# Patient Record
Sex: FEMALE | Race: WHITE | Employment: PART TIME | URBAN - METROPOLITAN AREA
[De-identification: names, ages, dates, MRNs, and addresses within clinical notes are randomized per-mention and may not be internally consistent; named-entity substitution may affect disease eponyms.]

---

## 2017-03-27 ENCOUNTER — HOSPITAL ENCOUNTER (EMERGENCY)
Age: 27
Discharge: HOME | End: 2017-03-27
Attending: EMERGENCY MEDICINE

## 2017-03-27 VITALS
HEART RATE: 88 BPM | WEIGHT: 105 LBS | BODY MASS INDEX: 17.49 KG/M2 | HEIGHT: 65 IN | RESPIRATION RATE: 22 BRPM | OXYGEN SATURATION: 98 % | SYSTOLIC BLOOD PRESSURE: 94 MMHG | DIASTOLIC BLOOD PRESSURE: 56 MMHG | TEMPERATURE: 98 F

## 2017-03-27 DIAGNOSIS — J03.90 ACUTE TONSILLITIS, UNSPECIFIED ETIOLOGY: Primary | ICD-10-CM

## 2017-03-27 LAB
ALBUMIN SERPL-MCNC: 3.7 G/DL (ref 3.5–4.8)
ALP LIVER SERPL-CCNC: 72 U/L (ref 37–98)
ALT SERPL-CCNC: 16 U/L (ref 14–54)
AST SERPL-CCNC: 15 U/L (ref 15–41)
BAND %: 7 %
BASOPHIL % MANUAL: 0 %
BASOPHIL ABSOLUTE MANUAL: 0 X10(3) UL (ref 0–0.1)
BILIRUB SERPL-MCNC: 0.8 MG/DL (ref 0.1–2)
BUN BLD-MCNC: 10 MG/DL (ref 8–20)
CALCIUM BLD-MCNC: 8.7 MG/DL (ref 8.3–10.3)
CHLORIDE: 104 MMOL/L (ref 101–111)
CO2: 24 MMOL/L (ref 22–32)
CREAT BLD-MCNC: 0.67 MG/DL (ref 0.55–1.02)
EOSINOPHIL % MANUAL: 0 %
EOSINOPHIL ABSOLUTE MANUAL: 0 X10(3) UL (ref 0–0.3)
ERYTHROCYTE [DISTWIDTH] IN BLOOD BY AUTOMATED COUNT: 12.3 % (ref 11.5–16)
GLUCOSE BLD-MCNC: 124 MG/DL (ref 70–99)
HCT VFR BLD AUTO: 36.9 % (ref 34–50)
HGB BLD-MCNC: 12.9 G/DL (ref 12–16)
LYMPHOCYTE % MANUAL: 5 %
LYMPHOCYTE ABSOLUTE MANUAL: 1.26 X10(3) UL (ref 0.9–4)
M PROTEIN MFR SERPL ELPH: 7.4 G/DL (ref 6.1–8.3)
MCH RBC QN AUTO: 32.9 PG (ref 27–33.2)
MCHC RBC AUTO-ENTMCNC: 35 G/DL (ref 31–37)
MCV RBC AUTO: 94.1 FL (ref 81–100)
MONOCYTE % MANUAL: 8 %
MONOCYTE ABSOLUTE MANUAL: 2.01 X10(3) UL (ref 0.1–0.6)
MONOSCREEN: NEGATIVE
MORPHOLOGY: NORMAL
NEUTROPHIL ABS PRELIM: 20.94 X10 (3) UL (ref 1.3–6.7)
NEUTROPHIL ABSOLUTE MANUAL: 21.84 X10(3) UL (ref 1.3–6.7)
NEUTROPHILS % MANUAL: 80 %
PLATELET # BLD AUTO: 188 10(3)UL (ref 150–450)
PLATELET MORPHOLOGY: NORMAL
POTASSIUM SERPL-SCNC: 3.6 MMOL/L (ref 3.6–5.1)
RBC # BLD AUTO: 3.92 X10(6)UL (ref 3.8–5.1)
RED CELL DISTRIBUTION WIDTH-SD: 42.7 FL (ref 35.1–46.3)
SODIUM SERPL-SCNC: 135 MMOL/L (ref 136–144)
TOTAL CELLS COUNTED: 100
WBC # BLD AUTO: 25.1 X10(3) UL (ref 4–13)

## 2017-03-27 PROCEDURE — 96374 THER/PROPH/DIAG INJ IV PUSH: CPT

## 2017-03-27 PROCEDURE — 86403 PARTICLE AGGLUT ANTBDY SCRN: CPT | Performed by: EMERGENCY MEDICINE

## 2017-03-27 PROCEDURE — 85007 BL SMEAR W/DIFF WBC COUNT: CPT | Performed by: EMERGENCY MEDICINE

## 2017-03-27 PROCEDURE — 99284 EMERGENCY DEPT VISIT MOD MDM: CPT

## 2017-03-27 PROCEDURE — 85027 COMPLETE CBC AUTOMATED: CPT | Performed by: EMERGENCY MEDICINE

## 2017-03-27 PROCEDURE — 96375 TX/PRO/DX INJ NEW DRUG ADDON: CPT

## 2017-03-27 PROCEDURE — 87081 CULTURE SCREEN ONLY: CPT | Performed by: EMERGENCY MEDICINE

## 2017-03-27 PROCEDURE — 87430 STREP A AG IA: CPT | Performed by: EMERGENCY MEDICINE

## 2017-03-27 PROCEDURE — 85025 COMPLETE CBC W/AUTO DIFF WBC: CPT | Performed by: EMERGENCY MEDICINE

## 2017-03-27 PROCEDURE — 96372 THER/PROPH/DIAG INJ SC/IM: CPT

## 2017-03-27 PROCEDURE — 96361 HYDRATE IV INFUSION ADD-ON: CPT

## 2017-03-27 PROCEDURE — 80053 COMPREHEN METABOLIC PANEL: CPT | Performed by: EMERGENCY MEDICINE

## 2017-03-27 RX ORDER — ONDANSETRON 2 MG/ML
4 INJECTION INTRAMUSCULAR; INTRAVENOUS ONCE
Status: COMPLETED | OUTPATIENT
Start: 2017-03-27 | End: 2017-03-27

## 2017-03-27 RX ORDER — METHYLPREDNISOLONE 4 MG/1
TABLET ORAL
Qty: 1 PACKAGE | Refills: 0 | Status: SHIPPED | OUTPATIENT
Start: 2017-03-27 | End: 2017-04-01

## 2017-03-27 RX ORDER — ACETAMINOPHEN 500 MG
1000 TABLET ORAL ONCE
Status: COMPLETED | OUTPATIENT
Start: 2017-03-27 | End: 2017-03-27

## 2017-03-27 RX ORDER — AMOXICILLIN AND CLAVULANATE POTASSIUM 875; 125 MG/1; MG/1
1 TABLET, FILM COATED ORAL 2 TIMES DAILY
Qty: 20 TABLET | Refills: 0 | Status: SHIPPED | OUTPATIENT
Start: 2017-03-27 | End: 2017-04-06

## 2017-03-27 RX ORDER — KETOROLAC TROMETHAMINE 30 MG/ML
30 INJECTION, SOLUTION INTRAMUSCULAR; INTRAVENOUS ONCE
Status: COMPLETED | OUTPATIENT
Start: 2017-03-27 | End: 2017-03-27

## 2017-03-27 RX ORDER — DEXAMETHASONE SODIUM PHOSPHATE 4 MG/ML
10 VIAL (ML) INJECTION ONCE
Status: COMPLETED | OUTPATIENT
Start: 2017-03-27 | End: 2017-03-27

## 2017-03-27 NOTE — ED PROVIDER NOTES
Patient Seen in: THE [facility] Emergency Department In [place]    History   Patient presents with:  Sore Throat    Stated Complaint: sore throat    HPI    The patient is a 32-year-old previously healthy female cleaning of 3 days of sore throat and fever and reviewed and negative except as noted above. PSFH elements reviewed from today and agreed except as otherwise stated in HPI.     Physical Exam       ED Triage Vitals   BP 03/27/17 0731 126/77 mmHg   Pulse 03/27/17 0731 125   Resp 03/27/17 0731 18   Temp Reviewed   COMP METABOLIC PANEL (14) - Abnormal; Notable for the following:     Glucose 124 (*)     Sodium 135 (*)     All other components within normal limits   MANUAL DIFFERENTIAL - Abnormal; Notable for the following:     Neutrophil Absolute Manual 21. despite the rapid strep being negative. She was given IM penicillin G. She will also be given a prescription for Augmentin and a Medrol Dosepak. She was encouraged to drink plenty of fluids.   She will return if she develops worrisome or worsening sympto

## 2017-09-05 ENCOUNTER — APPOINTMENT (OUTPATIENT)
Dept: GENERAL RADIOLOGY | Age: 27
End: 2017-09-05
Attending: PHYSICIAN ASSISTANT
Payer: COMMERCIAL

## 2017-09-05 ENCOUNTER — HOSPITAL ENCOUNTER (EMERGENCY)
Age: 27
Discharge: HOME | End: 2017-09-05
Payer: COMMERCIAL

## 2017-09-05 VITALS
RESPIRATION RATE: 18 BRPM | DIASTOLIC BLOOD PRESSURE: 77 MMHG | WEIGHT: 105 LBS | BODY MASS INDEX: 17.93 KG/M2 | SYSTOLIC BLOOD PRESSURE: 138 MMHG | HEIGHT: 64 IN | OXYGEN SATURATION: 100 % | HEART RATE: 87 BPM | TEMPERATURE: 99 F

## 2017-09-05 DIAGNOSIS — S00.83XA CONTUSION OF JAW, INITIAL ENCOUNTER: Primary | ICD-10-CM

## 2017-09-05 PROCEDURE — 99283 EMERGENCY DEPT VISIT LOW MDM: CPT

## 2017-09-05 PROCEDURE — 70110 X-RAY EXAM OF JAW 4/> VIEWS: CPT | Performed by: PHYSICIAN ASSISTANT

## 2017-09-05 RX ORDER — HYDROCODONE BITARTRATE AND ACETAMINOPHEN 5; 325 MG/1; MG/1
1 TABLET ORAL EVERY 6 HOURS PRN
Qty: 12 TABLET | Refills: 0 | Status: SHIPPED | OUTPATIENT
Start: 2017-09-05 | End: 2017-09-12

## 2017-09-05 NOTE — ED PROVIDER NOTES
Patient Seen in: THE [facility] Emergency Department In [place]    History   Patient presents with:  Jaw Pain    Stated Complaint: jaw pain    HPI    Patient is a pleasant 29-year-old female.   Yesterday, patient was at the playground with her partner and daug today and agreed except as otherwise stated in HPI.     Physical Exam   ED Triage Vitals [09/05/17 1437]  BP: 138/77  Pulse: 87  Resp: 18  Temp: 98.5 °F (36.9 °C)  Temp src: Oral  SpO2: 100 %  O2 Device: None (Room air)    Current:/77   Pulse 87   Tem ============================================================  ED Course  ------------------------------------------------------------  MDM     Full mandibular series demonstrates no acute bony abnormality. Continue soft diet, ice and ibuprofen.   Daylin Kick

## 2017-09-05 NOTE — ED INITIAL ASSESSMENT (HPI)
States yesterday she was hit in the left jaw with a metal pole at the park.  C/o pain to left side today

## 2017-09-08 ENCOUNTER — HOSPITAL ENCOUNTER (EMERGENCY)
Age: 27
Discharge: HOME | End: 2017-09-08
Attending: EMERGENCY MEDICINE
Payer: COMMERCIAL

## 2017-09-08 ENCOUNTER — APPOINTMENT (OUTPATIENT)
Dept: CT IMAGING | Age: 27
End: 2017-09-08
Attending: EMERGENCY MEDICINE
Payer: COMMERCIAL

## 2017-09-08 VITALS
TEMPERATURE: 99 F | BODY MASS INDEX: 18.78 KG/M2 | HEART RATE: 70 BPM | DIASTOLIC BLOOD PRESSURE: 72 MMHG | HEIGHT: 64 IN | SYSTOLIC BLOOD PRESSURE: 114 MMHG | OXYGEN SATURATION: 99 % | WEIGHT: 110 LBS | RESPIRATION RATE: 14 BRPM

## 2017-09-08 DIAGNOSIS — S09.8XXA BLUNT HEAD TRAUMA, INITIAL ENCOUNTER: Primary | ICD-10-CM

## 2017-09-08 DIAGNOSIS — S00.83XA CONTUSION OF FACE, INITIAL ENCOUNTER: ICD-10-CM

## 2017-09-08 LAB — POCT URINE PREGNANCY: NEGATIVE

## 2017-09-08 PROCEDURE — 70486 CT MAXILLOFACIAL W/O DYE: CPT | Performed by: EMERGENCY MEDICINE

## 2017-09-08 PROCEDURE — 70450 CT HEAD/BRAIN W/O DYE: CPT | Performed by: EMERGENCY MEDICINE

## 2017-09-08 PROCEDURE — 76377 3D RENDER W/INTRP POSTPROCES: CPT

## 2017-09-08 PROCEDURE — 96374 THER/PROPH/DIAG INJ IV PUSH: CPT

## 2017-09-08 PROCEDURE — 99284 EMERGENCY DEPT VISIT MOD MDM: CPT

## 2017-09-08 PROCEDURE — 96375 TX/PRO/DX INJ NEW DRUG ADDON: CPT

## 2017-09-08 PROCEDURE — 96361 HYDRATE IV INFUSION ADD-ON: CPT

## 2017-09-08 PROCEDURE — 96376 TX/PRO/DX INJ SAME DRUG ADON: CPT

## 2017-09-08 PROCEDURE — 81025 URINE PREGNANCY TEST: CPT

## 2017-09-08 RX ORDER — KETOROLAC TROMETHAMINE 30 MG/ML
30 INJECTION, SOLUTION INTRAMUSCULAR; INTRAVENOUS ONCE
Status: COMPLETED | OUTPATIENT
Start: 2017-09-08 | End: 2017-09-08

## 2017-09-08 RX ORDER — ONDANSETRON 4 MG/1
4 TABLET, ORALLY DISINTEGRATING ORAL EVERY 4 HOURS PRN
Qty: 10 TABLET | Refills: 0 | Status: SHIPPED | OUTPATIENT
Start: 2017-09-08 | End: 2017-09-15

## 2017-09-08 RX ORDER — HYDROCODONE BITARTRATE AND ACETAMINOPHEN 5; 325 MG/1; MG/1
1-2 TABLET ORAL EVERY 6 HOURS PRN
Qty: 15 TABLET | Refills: 0 | Status: SHIPPED | OUTPATIENT
Start: 2017-09-08 | End: 2017-09-20

## 2017-09-08 RX ORDER — HYDROMORPHONE HYDROCHLORIDE 1 MG/ML
0.5 INJECTION, SOLUTION INTRAMUSCULAR; INTRAVENOUS; SUBCUTANEOUS ONCE
Status: COMPLETED | OUTPATIENT
Start: 2017-09-08 | End: 2017-09-08

## 2017-09-08 RX ORDER — SODIUM CHLORIDE 9 MG/ML
INJECTION, SOLUTION INTRAVENOUS ONCE
Status: COMPLETED | OUTPATIENT
Start: 2017-09-08 | End: 2017-09-08

## 2017-09-08 RX ORDER — ONDANSETRON 2 MG/ML
4 INJECTION INTRAMUSCULAR; INTRAVENOUS ONCE
Status: COMPLETED | OUTPATIENT
Start: 2017-09-08 | End: 2017-09-08

## 2017-09-08 NOTE — ED PROVIDER NOTES
Patient Seen in: [facility] In [place]    History   Patient presents with:  Headache (neurologic)    Stated Complaint: headche    HPI    Patient is a 21-year-old female who presents emergency room with a history of playing on a playgr reviewed and negative except as noted above. PSFH elements reviewed from today and agreed except as otherwise stated in HPI.     Physical Exam   ED Triage Vitals [09/08/17 1800]  BP: 148/78  Pulse: 100  Resp: 20  Temp: 98.9 °F (37.2 °C)  Temp src: Tempor PREGNANCY, URINE - Normal   RAINBOW DRAW BLUE   RAINBOW DRAW LAVENDER   RAINBOW DRAW LIGHT GREEN   RAINBOW DRAW GOLD       ============================================================  ED Course  ------------------------------------------------------------ medications found. Please discuss with provider.

## 2017-09-08 NOTE — ED INITIAL ASSESSMENT (HPI)
Pt states tues at the playground metal turning pole hit left side of face, having continues pain to jaw and having diff   Opening her mouth and pain radiates into ear and having ha

## 2017-09-20 NOTE — ED INITIAL ASSESSMENT (HPI)
States was seen here Sept 5th for jaw injury was hit with a metal spinning at the park- had negative xray. Came back few days after because started feeling dizzy/nausea. Vomited x1. Had negative CT of head. States until now still dizzy/lightheaded. Nausea.

## 2017-09-21 ENCOUNTER — TELEPHONE (OUTPATIENT)
Dept: NEUROLOGY | Facility: CLINIC | Age: 27
End: 2017-09-21

## 2017-10-30 ENCOUNTER — OFFICE VISIT (OUTPATIENT)
Dept: NEUROLOGY | Facility: CLINIC | Age: 27
End: 2017-10-30

## 2017-10-30 VITALS
HEIGHT: 65 IN | SYSTOLIC BLOOD PRESSURE: 94 MMHG | BODY MASS INDEX: 19.33 KG/M2 | DIASTOLIC BLOOD PRESSURE: 58 MMHG | RESPIRATION RATE: 16 BRPM | WEIGHT: 116 LBS | HEART RATE: 102 BPM

## 2017-10-30 DIAGNOSIS — G43.009 MIGRAINE WITHOUT AURA AND WITHOUT STATUS MIGRAINOSUS, NOT INTRACTABLE: ICD-10-CM

## 2017-10-30 DIAGNOSIS — F07.81 POST CONCUSSION SYNDROME: ICD-10-CM

## 2017-10-30 DIAGNOSIS — S06.0X1A CONCUSSION WITH LOSS OF CONSCIOUSNESS OF 30 MINUTES OR LESS, INITIAL ENCOUNTER: Primary | ICD-10-CM

## 2017-10-30 PROCEDURE — 99204 OFFICE O/P NEW MOD 45 MIN: CPT | Performed by: OTHER

## 2017-10-30 RX ORDER — AMITRIPTYLINE HYDROCHLORIDE 25 MG/1
25 TABLET, FILM COATED ORAL NIGHTLY
Qty: 30 TABLET | Refills: 2 | Status: SHIPPED | OUTPATIENT
Start: 2017-10-30 | End: 2019-11-10

## 2017-10-30 RX ORDER — SUMATRIPTAN 50 MG/1
50 TABLET, FILM COATED ORAL EVERY 2 HOUR PRN
Qty: 9 TABLET | Refills: 0 | Status: SHIPPED | OUTPATIENT
Start: 2017-10-30 | End: 2017-11-29

## 2017-10-30 NOTE — PATIENT INSTRUCTIONS
Refill policies:    • Allow 2-3 business days for refills; controlled substances may take longer.   • Contact your pharmacy at least 5 days prior to running out of medication and have them send an electronic request or submit request through the [facility] have a procedure or additional testing performed. [facility]) will contact your insurance carrier to obtain pre-certification or prior authorization.     Unfortunately, [facility] has seen an increase in denial of payment even though the p

## 2017-10-30 NOTE — PROGRESS NOTES
HPI:    Patient ID: Savage Nam is a 24year old female. HPI    Beth Wood is a 24year old pleasant female who presented with complaints of persistent headache following a head concussion injury.  She has previous history of intermittent migraine Packs/day: 0.50      Years: 0.00         Types: Cigarettes  Smokeless tobacco: Never Used                      Alcohol use: No                 Review of Systems   Constitutional: Negative. HENT: Negative. Eyes: Positive for photophobia.    Respir are normal.   Skin: Skin is warm and dry. Psychiatric:  normal mood and affect. Neurological  Mental status: Awake, alert and oriented to time, place and person. Speech is fluent with intact comprehension, repetition and naming. Memory is intact.   Eliel Shepherd in the night for headache prevention. Side effects, risk and benefit explained in detail. We will start her on Imitrex + Aleve as needed for acute relief. She can take Reglan if needed for nausea.  We counseled her on post concussion symptoms and its usual

## 2017-11-27 ENCOUNTER — TELEPHONE (OUTPATIENT)
Dept: NEUROLOGY | Facility: CLINIC | Age: 27
End: 2017-11-27

## 2018-03-27 ENCOUNTER — HOSPITAL ENCOUNTER (EMERGENCY)
Age: 28
Discharge: HOME | End: 2018-03-27
Payer: COMMERCIAL

## 2018-03-27 ENCOUNTER — APPOINTMENT (OUTPATIENT)
Dept: GENERAL RADIOLOGY | Age: 28
End: 2018-03-27
Attending: PHYSICIAN ASSISTANT
Payer: COMMERCIAL

## 2018-03-27 VITALS
BODY MASS INDEX: 18.78 KG/M2 | HEIGHT: 64 IN | HEART RATE: 92 BPM | OXYGEN SATURATION: 100 % | TEMPERATURE: 98 F | DIASTOLIC BLOOD PRESSURE: 82 MMHG | SYSTOLIC BLOOD PRESSURE: 126 MMHG | WEIGHT: 110 LBS | RESPIRATION RATE: 16 BRPM

## 2018-03-27 DIAGNOSIS — M54.6 PAIN IN THORACIC SPINE: Primary | ICD-10-CM

## 2018-03-27 DIAGNOSIS — S30.0XXA LUMBAR CONTUSION, INITIAL ENCOUNTER: ICD-10-CM

## 2018-03-27 DIAGNOSIS — S30.0XXA SACRAL CONTUSION, INITIAL ENCOUNTER: ICD-10-CM

## 2018-03-27 PROCEDURE — 99284 EMERGENCY DEPT VISIT MOD MDM: CPT

## 2018-03-27 PROCEDURE — 72110 X-RAY EXAM L-2 SPINE 4/>VWS: CPT | Performed by: PHYSICIAN ASSISTANT

## 2018-03-27 PROCEDURE — 72220 X-RAY EXAM SACRUM TAILBONE: CPT | Performed by: PHYSICIAN ASSISTANT

## 2018-03-27 PROCEDURE — 72072 X-RAY EXAM THORAC SPINE 3VWS: CPT | Performed by: PHYSICIAN ASSISTANT

## 2018-03-27 RX ORDER — CYCLOBENZAPRINE HCL 10 MG
10 TABLET ORAL 3 TIMES DAILY PRN
Qty: 15 TABLET | Refills: 0 | Status: SHIPPED | OUTPATIENT
Start: 2018-03-27 | End: 2018-04-03

## 2018-03-27 RX ORDER — NAPROXEN 500 MG/1
500 TABLET ORAL 2 TIMES DAILY PRN
Qty: 30 TABLET | Refills: 0 | Status: SHIPPED | OUTPATIENT
Start: 2018-03-27 | End: 2018-04-03

## 2018-03-27 NOTE — ED INITIAL ASSESSMENT (HPI)
Cristino Wu on hard stairs on Friday, pain was lower back but now is mid back going up, pain shoots up, denies numbness/tingling, took Ibuprofen 400mg this morning without relief

## 2018-03-27 NOTE — ED PROVIDER NOTES
Patient Seen in: [facility] In [place]    History   Patient presents with:  Back Pain (musculoskeletal): fell down stairs friday, back pain getting worse    Stated Complaint:     ROMAIN    Bo Nieto is a 61-year-old female who comes in to abnormality  Neck:  Supple; symmetrical, trachea midline, no adenopathy  Lungs:  Clear to auscultation bilaterally, respirations unlabored   Heart:  Regular rate & rhythm, S1 and S2 normal, no murmurs, rubs, or gallops  Abdomen:  Soft, non-tender, bowel so Sacrum + Coccyx (min 2 Views) (cpt=72220)    Result Date: 3/27/2018  PROCEDURE:  XR SACRUM + COCCYX (MIN 2 VIEWS) (CPT=72220)  INDICATIONS:  sacral contusion, fall down wood stairs  COMPARISON:  None.   PATIENT STATED HISTORY: (As transcribed by [employer] frostbite. Do not sleep with a heating pad as this will make the spasm worse. Do not use topical adhesive heat patches as this will make the spasm worse. Change positions frequently throughout the day. Avoid heavy lifting.  Once your pain has improved, sta None Pcp  - as instructed. The patient verbalized understanding of the discharge instructions and plan.

## 2018-12-06 ENCOUNTER — HOSPITAL ENCOUNTER (EMERGENCY)
Age: 28
Discharge: HOME | End: 2018-12-06

## 2018-12-06 ENCOUNTER — APPOINTMENT (OUTPATIENT)
Dept: GENERAL RADIOLOGY | Age: 28
End: 2018-12-06

## 2018-12-06 VITALS
OXYGEN SATURATION: 99 % | RESPIRATION RATE: 18 BRPM | TEMPERATURE: 99 F | HEART RATE: 100 BPM | DIASTOLIC BLOOD PRESSURE: 81 MMHG | SYSTOLIC BLOOD PRESSURE: 137 MMHG

## 2018-12-06 DIAGNOSIS — M25.561 CHRONIC PAIN OF RIGHT KNEE: Primary | ICD-10-CM

## 2018-12-06 DIAGNOSIS — J01.10 ACUTE FRONTAL SINUSITIS, RECURRENCE NOT SPECIFIED: ICD-10-CM

## 2018-12-06 DIAGNOSIS — G89.29 CHRONIC PAIN OF RIGHT KNEE: Primary | ICD-10-CM

## 2018-12-06 PROCEDURE — 99283 EMERGENCY DEPT VISIT LOW MDM: CPT

## 2018-12-06 PROCEDURE — 73562 X-RAY EXAM OF KNEE 3: CPT

## 2018-12-06 RX ORDER — AMOXICILLIN AND CLAVULANATE POTASSIUM 875; 125 MG/1; MG/1
1 TABLET, FILM COATED ORAL 2 TIMES DAILY
Qty: 20 TABLET | Refills: 0 | Status: SHIPPED | OUTPATIENT
Start: 2018-12-06 | End: 2018-12-16

## 2018-12-07 NOTE — ED INITIAL ASSESSMENT (HPI)
States she fell a week ago and hurt her right knee. Now endorsing severe right knee pain/ runny nose and sore throat.

## 2018-12-07 NOTE — ED PROVIDER NOTES
Patient Seen in: [facility] Emergency Department In [place]    History   Patient presents with:  Cough/URI  Knee Pain    Stated Complaint: States she fell a week ago and hurt her right knee.  Now endorsing severe right *    HPI    Jair Ashton is a pleasant 34 rigidity  Lungs are clear to auscultation bilaterally with no wheezes retractions or rhonchi noted  Cardiovascular exam shows a regular rate and rhythm  Abdomen soft nontender with no rebound tenderness noted  Extremity exam shows no clubbing cyanosis or e

## 2018-12-30 ENCOUNTER — HOSPITAL ENCOUNTER (EMERGENCY)
Facility: HOSPITAL | Age: 28
Discharge: HOME | End: 2018-12-30
Attending: EMERGENCY MEDICINE

## 2018-12-30 ENCOUNTER — APPOINTMENT (OUTPATIENT)
Dept: GENERAL RADIOLOGY | Facility: HOSPITAL | Age: 28
End: 2018-12-30
Attending: EMERGENCY MEDICINE

## 2018-12-30 VITALS
HEIGHT: 64 IN | WEIGHT: 110 LBS | RESPIRATION RATE: 17 BRPM | HEART RATE: 90 BPM | TEMPERATURE: 98 F | DIASTOLIC BLOOD PRESSURE: 58 MMHG | SYSTOLIC BLOOD PRESSURE: 98 MMHG | OXYGEN SATURATION: 100 % | BODY MASS INDEX: 18.78 KG/M2

## 2018-12-30 DIAGNOSIS — R07.89 CHEST PAIN, ATYPICAL: Primary | ICD-10-CM

## 2018-12-30 DIAGNOSIS — F41.1 ANXIETY REACTION: ICD-10-CM

## 2018-12-30 LAB
ALBUMIN SERPL-MCNC: 3.5 G/DL (ref 3.1–4.5)
ALBUMIN/GLOB SERPL: 0.9 {RATIO} (ref 1–2)
ALP LIVER SERPL-CCNC: 77 U/L (ref 37–98)
ALT SERPL-CCNC: 37 U/L (ref 14–54)
ANION GAP SERPL CALC-SCNC: 5 MMOL/L (ref 0–18)
AST SERPL-CCNC: 27 U/L (ref 15–41)
BASOPHILS # BLD AUTO: 0.03 X10(3) UL (ref 0–0.1)
BASOPHILS NFR BLD AUTO: 0.4 %
BILIRUB SERPL-MCNC: 0.3 MG/DL (ref 0.1–2)
BILIRUB UR QL STRIP.AUTO: NEGATIVE
BUN BLD-MCNC: 9 MG/DL (ref 8–20)
BUN/CREAT SERPL: 12.7 (ref 10–20)
CALCIUM BLD-MCNC: 8.7 MG/DL (ref 8.3–10.3)
CHLORIDE SERPL-SCNC: 110 MMOL/L (ref 101–111)
CLARITY UR REFRACT.AUTO: CLEAR
CO2 SERPL-SCNC: 25 MMOL/L (ref 22–32)
COLOR UR AUTO: YELLOW
CREAT BLD-MCNC: 0.71 MG/DL (ref 0.55–1.02)
D-DIMER: 0.45 UG/ML FEU (ref 0–0.49)
EOSINOPHIL # BLD AUTO: 0.15 X10(3) UL (ref 0–0.3)
EOSINOPHIL NFR BLD AUTO: 1.8 %
ERYTHROCYTE [DISTWIDTH] IN BLOOD BY AUTOMATED COUNT: 11.7 % (ref 11.5–16)
GLOBULIN PLAS-MCNC: 3.7 G/DL (ref 2.8–4.4)
GLUCOSE BLD-MCNC: 105 MG/DL (ref 70–99)
GLUCOSE UR STRIP.AUTO-MCNC: NEGATIVE MG/DL
HCT VFR BLD AUTO: 38.1 % (ref 34–50)
HGB BLD-MCNC: 13.4 G/DL (ref 12–16)
IMMATURE GRANULOCYTE COUNT: 0.02 X10(3) UL (ref 0–1)
IMMATURE GRANULOCYTE RATIO %: 0.2 %
KETONES UR STRIP.AUTO-MCNC: NEGATIVE MG/DL
LEUKOCYTE ESTERASE UR QL STRIP.AUTO: NEGATIVE
LYMPHOCYTES # BLD AUTO: 2.27 X10(3) UL (ref 0.9–4)
LYMPHOCYTES NFR BLD AUTO: 27.3 %
M PROTEIN MFR SERPL ELPH: 7.2 G/DL (ref 6.4–8.2)
MCH RBC QN AUTO: 33.1 PG (ref 27–33.2)
MCHC RBC AUTO-ENTMCNC: 35.2 G/DL (ref 31–37)
MCV RBC AUTO: 94.1 FL (ref 81–100)
MONOCYTES # BLD AUTO: 0.66 X10(3) UL (ref 0.1–1)
MONOCYTES NFR BLD AUTO: 7.9 %
NEUTROPHIL ABS PRELIM: 5.2 X10 (3) UL (ref 1.3–6.7)
NEUTROPHILS # BLD AUTO: 5.2 X10(3) UL (ref 1.3–6.7)
NEUTROPHILS NFR BLD AUTO: 62.4 %
NITRITE UR QL STRIP.AUTO: NEGATIVE
OSMOLALITY SERPL CALC.SUM OF ELEC: 289 MOSM/KG (ref 275–295)
PH UR STRIP.AUTO: 7 [PH] (ref 4.5–8)
PLATELET # BLD AUTO: 228 10(3)UL (ref 150–450)
POCT LOT NUMBER: NORMAL
POCT URINE PREGNANCY: NEGATIVE
POTASSIUM SERPL-SCNC: 3.9 MMOL/L (ref 3.6–5.1)
PROT UR STRIP.AUTO-MCNC: NEGATIVE MG/DL
RBC # BLD AUTO: 4.05 X10(6)UL (ref 3.8–5.1)
RBC #/AREA URNS AUTO: >10 /HPF
RED CELL DISTRIBUTION WIDTH-SD: 40.5 FL (ref 35.1–46.3)
SODIUM SERPL-SCNC: 140 MMOL/L (ref 136–144)
SP GR UR STRIP.AUTO: 1.01 (ref 1–1.03)
T4 FREE SERPL-MCNC: 0.6 NG/DL (ref 0.9–1.8)
TROPONIN I SERPL-MCNC: <0.046 NG/ML (ref ?–0.05)
UROBILINOGEN UR STRIP.AUTO-MCNC: <2 MG/DL
WBC # BLD AUTO: 8.3 X10(3) UL (ref 4–13)

## 2018-12-30 PROCEDURE — 85378 FIBRIN DEGRADE SEMIQUANT: CPT | Performed by: EMERGENCY MEDICINE

## 2018-12-30 PROCEDURE — 85025 COMPLETE CBC W/AUTO DIFF WBC: CPT | Performed by: EMERGENCY MEDICINE

## 2018-12-30 PROCEDURE — 84439 ASSAY OF FREE THYROXINE: CPT | Performed by: EMERGENCY MEDICINE

## 2018-12-30 PROCEDURE — 71046 X-RAY EXAM CHEST 2 VIEWS: CPT | Performed by: EMERGENCY MEDICINE

## 2018-12-30 PROCEDURE — 81025 URINE PREGNANCY TEST: CPT

## 2018-12-30 PROCEDURE — 84484 ASSAY OF TROPONIN QUANT: CPT | Performed by: EMERGENCY MEDICINE

## 2018-12-30 PROCEDURE — 81001 URINALYSIS AUTO W/SCOPE: CPT | Performed by: EMERGENCY MEDICINE

## 2018-12-30 PROCEDURE — 99285 EMERGENCY DEPT VISIT HI MDM: CPT

## 2018-12-30 PROCEDURE — 96374 THER/PROPH/DIAG INJ IV PUSH: CPT

## 2018-12-30 PROCEDURE — 93010 ELECTROCARDIOGRAM REPORT: CPT

## 2018-12-30 PROCEDURE — 93005 ELECTROCARDIOGRAM TRACING: CPT

## 2018-12-30 PROCEDURE — 80053 COMPREHEN METABOLIC PANEL: CPT | Performed by: EMERGENCY MEDICINE

## 2018-12-30 RX ORDER — LORAZEPAM 2 MG/ML
1 INJECTION INTRAMUSCULAR ONCE
Status: COMPLETED | OUTPATIENT
Start: 2018-12-30 | End: 2018-12-30

## 2018-12-30 RX ORDER — ALPRAZOLAM 0.25 MG/1
0.25 TABLET ORAL 3 TIMES DAILY PRN
Qty: 5 TABLET | Refills: 0 | Status: SHIPPED | OUTPATIENT
Start: 2018-12-30 | End: 2019-01-06

## 2018-12-30 NOTE — ED INITIAL ASSESSMENT (HPI)
Pt c/o DACIA and cough for past 2-weeks, Pt was seen at [facility] 2-weeks ago and diagnosed w/ sinus infection

## 2018-12-31 LAB
ATRIAL RATE: 83 BPM
P AXIS: 52 DEGREES
P-R INTERVAL: 126 MS
Q-T INTERVAL: 358 MS
QRS DURATION: 78 MS
QTC CALCULATION (BEZET): 420 MS
R AXIS: 74 DEGREES
T AXIS: 56 DEGREES
VENTRICULAR RATE: 83 BPM

## 2018-12-31 NOTE — ED PROVIDER NOTES
Patient Seen in: [facility] Emergency Department    History   Patient presents with:  Dyspnea DACIA SOB (respiratory)  Anxiety/Panic attack (neurologic)    Stated Complaint: feels short of breath, anxious.  Was in PED 2 weeks for same    HPI    28-year Pulse 90   Temp 97.9 °F (36.6 °C) (Oral)   Resp 17   Ht 162.6 cm (5' 4\")   Wt 49.9 kg   LMP 12/30/2018   SpO2 100%   BMI 18.88 kg/m²         Physical Exam    Well-developed well-nourished female who is sitting on the gurney, she is awake and alert and kunal evaluation of the patient. 1st Trimester:  0.05-0.95 ug/mL (FEU)     2nd Trimester:  0.32-1.29 ug/mL (FEU)    3rd Trimester:  0.13-1.70 ug/mL (FEU)    In pregnant females, refer to the chart above.   No studies have been performed  on pregnant females ex days now without EKG changes or enzyme changes has a negative d-dimer. Symptoms seem to improve after Ativan. She will be discharged home with a short course of Xanax. She will be given a referral to a primary care physician as well.   She is advised to

## 2019-11-10 ENCOUNTER — HOSPITAL ENCOUNTER (EMERGENCY)
Age: 29
Discharge: HOME | End: 2019-11-10
Attending: EMERGENCY MEDICINE

## 2019-11-10 ENCOUNTER — APPOINTMENT (OUTPATIENT)
Dept: CT IMAGING | Age: 29
End: 2019-11-10
Attending: EMERGENCY MEDICINE

## 2019-11-10 ENCOUNTER — APPOINTMENT (OUTPATIENT)
Dept: GENERAL RADIOLOGY | Age: 29
End: 2019-11-10
Attending: PHYSICIAN ASSISTANT

## 2019-11-10 VITALS
HEIGHT: 64 IN | OXYGEN SATURATION: 100 % | BODY MASS INDEX: 17.93 KG/M2 | HEART RATE: 58 BPM | WEIGHT: 105 LBS | RESPIRATION RATE: 18 BRPM | SYSTOLIC BLOOD PRESSURE: 100 MMHG | TEMPERATURE: 98 F | DIASTOLIC BLOOD PRESSURE: 65 MMHG

## 2019-11-10 DIAGNOSIS — R09.1 PLEURISY: Primary | ICD-10-CM

## 2019-11-10 DIAGNOSIS — F41.9 ANXIETY: ICD-10-CM

## 2019-11-10 PROCEDURE — 84484 ASSAY OF TROPONIN QUANT: CPT | Performed by: EMERGENCY MEDICINE

## 2019-11-10 PROCEDURE — 85025 COMPLETE CBC W/AUTO DIFF WBC: CPT | Performed by: PHYSICIAN ASSISTANT

## 2019-11-10 PROCEDURE — 71046 X-RAY EXAM CHEST 2 VIEWS: CPT | Performed by: PHYSICIAN ASSISTANT

## 2019-11-10 PROCEDURE — 81025 URINE PREGNANCY TEST: CPT

## 2019-11-10 PROCEDURE — 80053 COMPREHEN METABOLIC PANEL: CPT | Performed by: PHYSICIAN ASSISTANT

## 2019-11-10 PROCEDURE — 93010 ELECTROCARDIOGRAM REPORT: CPT

## 2019-11-10 PROCEDURE — 71275 CT ANGIOGRAPHY CHEST: CPT | Performed by: EMERGENCY MEDICINE

## 2019-11-10 PROCEDURE — 85379 FIBRIN DEGRADATION QUANT: CPT | Performed by: PHYSICIAN ASSISTANT

## 2019-11-10 PROCEDURE — 96361 HYDRATE IV INFUSION ADD-ON: CPT

## 2019-11-10 PROCEDURE — 93005 ELECTROCARDIOGRAM TRACING: CPT

## 2019-11-10 PROCEDURE — 99285 EMERGENCY DEPT VISIT HI MDM: CPT

## 2019-11-10 PROCEDURE — 96374 THER/PROPH/DIAG INJ IV PUSH: CPT

## 2019-11-10 RX ORDER — LORAZEPAM 1 MG/1
1 TABLET ORAL ONCE
Status: COMPLETED | OUTPATIENT
Start: 2019-11-10 | End: 2019-11-10

## 2019-11-10 RX ORDER — KETOROLAC TROMETHAMINE 30 MG/ML
15 INJECTION, SOLUTION INTRAMUSCULAR; INTRAVENOUS ONCE
Status: COMPLETED | OUTPATIENT
Start: 2019-11-10 | End: 2019-11-10

## 2019-11-10 RX ORDER — KETOROLAC TROMETHAMINE 10 MG/1
10 TABLET, FILM COATED ORAL EVERY 6 HOURS PRN
Qty: 30 TABLET | Refills: 0 | Status: SHIPPED | OUTPATIENT
Start: 2019-11-10 | End: 2019-11-17

## 2019-11-10 NOTE — ED PROVIDER NOTES
Patient Seen in: THE [facility] Emergency Department In [place]      History   Patient presents with:  Cough/URI    Stated Complaint: cough with \"clear and yellow\" sputum, body aches; denies fever; \"hot and cold f*    17-year-old  female with hist in acute distress. Appearance: Normal appearance. She is normal weight. She is not ill-appearing, toxic-appearing or diaphoretic. HENT:      Nose: Nose normal. No congestion or rhinorrhea.       Mouth/Throat:      Mouth: Mucous membranes are moist. a negative predictive value of  approximately 95% when results are used as part of the total clinical  evaluation of the patient.     1st Trimester:  0.05-0.95 ug/mL (FEU)     2nd Trimester:  0.32-1.29 ug/mL (FEU)    3rd Trimester:  0.13-1.70 ug/mL (FEU) Chest Pa + Lat Chest (cpt=71046)    Result Date: 11/10/2019  PROCEDURE:  XR CHEST PA + LAT CHEST (CPT=71046)  INDICATIONS:  cough with clear and yellow sputum, body aches; denies fever; hot and cold flashes  COMPARISON:  [facility] , XR, XR CHEST PA + LAT CHES 12:07          Ct Angiography, Chest (cpt=71275)    Result Date: 11/10/2019  PROCEDURE:  CT ANGIOGRAPHY, CHEST (CPT=71275)  COMPARISON:  None.   INDICATIONS:  cough with clear and yellow sputum, body aches; denies fever; hot and cold flashes  TECHNIQUE:  IV of DVT or PE  (1.5) 0   Active/recent malignancy   (1) 0   Hemoptysis                        (1) 0   TOTAL 0   If Score is 4.5 or higher, then PE is likely        PERC SCORE: Yes = 1, No = 0  Age < 50 1   Heart rate < 100 1   Room Air O2 Sat > 94% 1   No H

## 2019-11-10 NOTE — ED NOTES
I reviewed that chart and discussed the case. I have examined the patient and noted. The patient states that she had because congestion body aches onset today. Denies any fever complains of a cold sweat daughter recently diagnosed with strep.   The patijoe (dxk=[id number])    Result Date: 11/10/2019  PROCEDURE:  CT ANGIOGRAPHY, CHEST (CPT=71275)  COMPARISON:  None.   INDICATIONS:  cough with clear and yellow sputum, body aches; denies fever; hot and cold flashes  TECHNIQUE:  IV contrast-enhanced multislice CT dolly for coronary disease she feels comfortable going home. I recommend close follow-up with her primary care physician. Return if increasing pain discomfort.   I agree with the following clinical impression(s): Chest pain  Dyspnea  Chest wall pain      I agre

## 2019-11-10 NOTE — ED INITIAL ASSESSMENT (HPI)
Chest congestion body aches onset today. Pt denies any fevers. C/o cold sweats. Daughter recently dx with strep .

## 2020-07-16 ENCOUNTER — HOSPITAL ENCOUNTER (EMERGENCY)
Age: 30
Discharge: HOME | End: 2020-07-16
Attending: EMERGENCY MEDICINE
Payer: COMMERCIAL

## 2020-07-16 ENCOUNTER — APPOINTMENT (OUTPATIENT)
Dept: GENERAL RADIOLOGY | Age: 30
End: 2020-07-16
Attending: EMERGENCY MEDICINE
Payer: COMMERCIAL

## 2020-07-16 VITALS
HEART RATE: 68 BPM | OXYGEN SATURATION: 99 % | TEMPERATURE: 98 F | DIASTOLIC BLOOD PRESSURE: 57 MMHG | BODY MASS INDEX: 18.33 KG/M2 | WEIGHT: 110 LBS | HEIGHT: 65 IN | RESPIRATION RATE: 48 BRPM | SYSTOLIC BLOOD PRESSURE: 95 MMHG

## 2020-07-16 DIAGNOSIS — M54.6 ACUTE MIDLINE THORACIC BACK PAIN: Primary | ICD-10-CM

## 2020-07-16 LAB
BILIRUB UR QL STRIP.AUTO: NEGATIVE
COLOR UR AUTO: YELLOW
CRP SERPL-MCNC: <0.29 MG/DL (ref ?–0.3)
GLUCOSE UR STRIP.AUTO-MCNC: NEGATIVE MG/DL
KETONES UR STRIP.AUTO-MCNC: NEGATIVE MG/DL
NITRITE UR QL STRIP.AUTO: NEGATIVE
PH UR STRIP.AUTO: 8 [PH] (ref 4.5–8)
POCT LOT NUMBER: NORMAL
POCT URINE PREGNANCY: NEGATIVE
PROT UR STRIP.AUTO-MCNC: NEGATIVE MG/DL
SP GR UR STRIP.AUTO: 1.01 (ref 1–1.03)
UROBILINOGEN UR STRIP.AUTO-MCNC: 0.2 MG/DL
YEAST URINE: PRESENT

## 2020-07-16 PROCEDURE — 99284 EMERGENCY DEPT VISIT MOD MDM: CPT

## 2020-07-16 PROCEDURE — 87086 URINE CULTURE/COLONY COUNT: CPT | Performed by: EMERGENCY MEDICINE

## 2020-07-16 PROCEDURE — 72072 X-RAY EXAM THORAC SPINE 3VWS: CPT | Performed by: EMERGENCY MEDICINE

## 2020-07-16 PROCEDURE — 86140 C-REACTIVE PROTEIN: CPT | Performed by: EMERGENCY MEDICINE

## 2020-07-16 PROCEDURE — 36415 COLL VENOUS BLD VENIPUNCTURE: CPT

## 2020-07-16 PROCEDURE — 81001 URINALYSIS AUTO W/SCOPE: CPT | Performed by: EMERGENCY MEDICINE

## 2020-07-16 PROCEDURE — 81025 URINE PREGNANCY TEST: CPT

## 2020-07-16 RX ORDER — IBUPROFEN 600 MG/1
600 TABLET ORAL ONCE
Status: COMPLETED | OUTPATIENT
Start: 2020-07-16 | End: 2020-07-16

## 2020-07-16 RX ORDER — PREDNISONE 20 MG/1
60 TABLET ORAL ONCE
Status: COMPLETED | OUTPATIENT
Start: 2020-07-16 | End: 2020-07-16

## 2020-07-16 RX ORDER — METHYLPREDNISOLONE 4 MG/1
TABLET ORAL
Qty: 1 PACKAGE | Refills: 0 | Status: SHIPPED | OUTPATIENT
Start: 2020-07-16

## 2020-07-16 NOTE — ED INITIAL ASSESSMENT (HPI)
Pt c/o back pain that started in middle of back/spine. Radiates both up and down into bilateral legs, sts legs feel tingly. No known injury. No loss of bowel or bladder function.

## 2020-07-16 NOTE — ED PROVIDER NOTES
Patient Seen in: THE [facility] Emergency Department In [place]      History   Patient presents with:  Back Pain    Stated Complaint: back pain x 3 days, radiates to both legs with tingly sensation. no injury.     HPI    This is a 29-year-old female that compl reviewed. All other systems reviewed and negative except as noted above.     Physical Exam     ED Triage Vitals [07/16/20 1524]   /67   Pulse 93   Resp 16   Temp 98.1 °F (36.7 °C)   Temp src    SpO2 98 %   O2 Device None (Room air)       Current: thoracic spine were obtained. COMPARISON:  [facility], XR, XR ROUTINE THORACIC SPINE (3 VIEWS) (CPT=72072), 3/27/2018, 3:01 PM.  INDICATIONS:  back pain x 3 days, radiates to both legs with tingly sensation. no injury.   PATIENT STATED HISTORY: (As transcr Medication List    START taking these medications    methylPREDNISolone (MEDROL) 4 MG Oral Tablet Therapy Pack  Dosepack: take as directed  Qty: 1 Package Refills: 0

## 2022-04-02 ENCOUNTER — HOSPITAL ENCOUNTER (EMERGENCY)
Age: 32
Discharge: HOME | End: 2022-04-03
Attending: EMERGENCY MEDICINE
Payer: MEDICAID

## 2022-04-02 DIAGNOSIS — R10.9 ABDOMINAL PAIN OF UNKNOWN ETIOLOGY: Primary | ICD-10-CM

## 2022-04-02 DIAGNOSIS — N80.9 ENDOMETRIOSIS: ICD-10-CM

## 2022-04-02 LAB
ALBUMIN SERPL-MCNC: 4 G/DL (ref 3.4–5)
ALBUMIN/GLOB SERPL: 1.2 {RATIO} (ref 1–2)
ALP LIVER SERPL-CCNC: 65 U/L
ALT SERPL-CCNC: 38 U/L
ANION GAP SERPL CALC-SCNC: 6 MMOL/L (ref 0–18)
AST SERPL-CCNC: 22 U/L (ref 15–37)
B-HCG UR QL: NEGATIVE
BASOPHILS # BLD AUTO: 0.02 X10(3) UL (ref 0–0.2)
BASOPHILS NFR BLD AUTO: 0.3 %
BILIRUB SERPL-MCNC: 0.3 MG/DL (ref 0.1–2)
BILIRUB UR QL STRIP.AUTO: NEGATIVE
BUN BLD-MCNC: 7 MG/DL (ref 7–18)
CALCIUM BLD-MCNC: 9.1 MG/DL (ref 8.5–10.1)
CHLORIDE SERPL-SCNC: 105 MMOL/L (ref 98–112)
CLARITY UR REFRACT.AUTO: CLEAR
CO2 SERPL-SCNC: 28 MMOL/L (ref 21–32)
COLOR UR AUTO: YELLOW
CREAT BLD-MCNC: 0.79 MG/DL
EOSINOPHIL # BLD AUTO: 0.22 X10(3) UL (ref 0–0.7)
EOSINOPHIL NFR BLD AUTO: 3 %
ERYTHROCYTE [DISTWIDTH] IN BLOOD BY AUTOMATED COUNT: 11.9 %
GLOBULIN PLAS-MCNC: 3.4 G/DL (ref 2.8–4.4)
GLUCOSE BLD-MCNC: 77 MG/DL (ref 70–99)
GLUCOSE UR STRIP.AUTO-MCNC: NEGATIVE MG/DL
HCT VFR BLD AUTO: 37.9 %
HGB BLD-MCNC: 12.8 G/DL
IMM GRANULOCYTES # BLD AUTO: 0.02 X10(3) UL (ref 0–1)
IMM GRANULOCYTES NFR BLD: 0.3 %
KETONES UR STRIP.AUTO-MCNC: NEGATIVE MG/DL
LEUKOCYTE ESTERASE UR QL STRIP.AUTO: NEGATIVE
LYMPHOCYTES # BLD AUTO: 2.61 X10(3) UL (ref 1–4)
LYMPHOCYTES NFR BLD AUTO: 35.9 %
MCH RBC QN AUTO: 32.1 PG (ref 26–34)
MCHC RBC AUTO-ENTMCNC: 33.8 G/DL (ref 31–37)
MCV RBC AUTO: 95 FL
MONOCYTES # BLD AUTO: 0.46 X10(3) UL (ref 0.1–1)
MONOCYTES NFR BLD AUTO: 6.3 %
NEUTROPHILS # BLD AUTO: 3.95 X10 (3) UL (ref 1.5–7.7)
NEUTROPHILS # BLD AUTO: 3.95 X10(3) UL (ref 1.5–7.7)
NEUTROPHILS NFR BLD AUTO: 54.2 %
NITRITE UR QL STRIP.AUTO: NEGATIVE
OSMOLALITY SERPL CALC.SUM OF ELEC: 285 MOSM/KG (ref 275–295)
PH UR STRIP.AUTO: 7 [PH] (ref 5–8)
PLATELET # BLD AUTO: 265 10(3)UL (ref 150–450)
POTASSIUM SERPL-SCNC: 4.1 MMOL/L (ref 3.5–5.1)
PROT SERPL-MCNC: 7.4 G/DL (ref 6.4–8.2)
PROT UR STRIP.AUTO-MCNC: NEGATIVE MG/DL
RBC # BLD AUTO: 3.99 X10(6)UL
SODIUM SERPL-SCNC: 139 MMOL/L (ref 136–145)
SP GR UR STRIP.AUTO: 1.02 (ref 1–1.03)
UROBILINOGEN UR STRIP.AUTO-MCNC: 1 MG/DL
WBC # BLD AUTO: 7.3 X10(3) UL (ref 4–11)

## 2022-04-02 PROCEDURE — 80053 COMPREHEN METABOLIC PANEL: CPT | Performed by: EMERGENCY MEDICINE

## 2022-04-02 PROCEDURE — 85025 COMPLETE CBC W/AUTO DIFF WBC: CPT | Performed by: EMERGENCY MEDICINE

## 2022-04-02 PROCEDURE — 81001 URINALYSIS AUTO W/SCOPE: CPT | Performed by: EMERGENCY MEDICINE

## 2022-04-02 PROCEDURE — 99284 EMERGENCY DEPT VISIT MOD MDM: CPT

## 2022-04-02 PROCEDURE — 96374 THER/PROPH/DIAG INJ IV PUSH: CPT

## 2022-04-02 PROCEDURE — 99285 EMERGENCY DEPT VISIT HI MDM: CPT

## 2022-04-02 PROCEDURE — 81025 URINE PREGNANCY TEST: CPT

## 2022-04-02 PROCEDURE — 96376 TX/PRO/DX INJ SAME DRUG ADON: CPT

## 2022-04-02 PROCEDURE — 96361 HYDRATE IV INFUSION ADD-ON: CPT

## 2022-04-02 PROCEDURE — 96375 TX/PRO/DX INJ NEW DRUG ADDON: CPT

## 2022-04-02 RX ORDER — DEXTROAMPHETAMINE SACCHARATE, AMPHETAMINE ASPARTATE MONOHYDRATE, DEXTROAMPHETAMINE SULFATE AND AMPHETAMINE SULFATE 6.25; 6.25; 6.25; 6.25 MG/1; MG/1; MG/1; MG/1
25 CAPSULE, EXTENDED RELEASE ORAL EVERY MORNING
COMMUNITY

## 2022-04-02 RX ORDER — KETOROLAC TROMETHAMINE 15 MG/ML
15 INJECTION, SOLUTION INTRAMUSCULAR; INTRAVENOUS ONCE
Status: COMPLETED | OUTPATIENT
Start: 2022-04-02 | End: 2022-04-02

## 2022-04-02 RX ORDER — HYDROMORPHONE HYDROCHLORIDE 1 MG/ML
0.5 INJECTION, SOLUTION INTRAMUSCULAR; INTRAVENOUS; SUBCUTANEOUS EVERY 30 MIN PRN
Status: DISCONTINUED | OUTPATIENT
Start: 2022-04-02 | End: 2022-04-03

## 2022-04-02 RX ORDER — ESCITALOPRAM OXALATE 10 MG/1
10 TABLET ORAL DAILY
COMMUNITY

## 2022-04-03 ENCOUNTER — APPOINTMENT (OUTPATIENT)
Dept: CT IMAGING | Age: 32
End: 2022-04-03
Attending: EMERGENCY MEDICINE
Payer: MEDICAID

## 2022-04-03 VITALS
WEIGHT: 115 LBS | RESPIRATION RATE: 20 BRPM | DIASTOLIC BLOOD PRESSURE: 51 MMHG | HEART RATE: 64 BPM | TEMPERATURE: 99 F | SYSTOLIC BLOOD PRESSURE: 103 MMHG | OXYGEN SATURATION: 98 % | BODY MASS INDEX: 19.63 KG/M2 | HEIGHT: 64 IN

## 2022-04-03 PROCEDURE — 74176 CT ABD & PELVIS W/O CONTRAST: CPT | Performed by: EMERGENCY MEDICINE

## 2022-04-03 NOTE — ED INITIAL ASSESSMENT (HPI)
Pt states started period on Wednesday w/ large clots and heavy bleeding since. C/o lower abd pain, n/v and diarrhea.  States she feels \"very weak\"  States she was on various antibiotics for dental procedure for three weeks

## 2024-01-25 ENCOUNTER — TELEPHONE (OUTPATIENT)
Dept: OTHER | Age: 34
End: 2024-01-25

## 2024-04-18 ENCOUNTER — HOSPITAL ENCOUNTER (EMERGENCY)
Age: 34
Discharge: HOME | End: 2024-04-18
Attending: EMERGENCY MEDICINE
Payer: MEDICAID

## 2024-04-18 ENCOUNTER — APPOINTMENT (OUTPATIENT)
Dept: ULTRASOUND IMAGING | Age: 34
End: 2024-04-18
Attending: NURSE PRACTITIONER
Payer: MEDICAID

## 2024-04-18 VITALS
TEMPERATURE: 98 F | HEART RATE: 106 BPM | SYSTOLIC BLOOD PRESSURE: 121 MMHG | HEIGHT: 64 IN | WEIGHT: 110 LBS | BODY MASS INDEX: 18.78 KG/M2 | RESPIRATION RATE: 20 BRPM | OXYGEN SATURATION: 98 % | DIASTOLIC BLOOD PRESSURE: 94 MMHG

## 2024-04-18 DIAGNOSIS — N94.89 PELVIC CONGESTION SYNDROME: Primary | ICD-10-CM

## 2024-04-18 LAB
ALBUMIN SERPL-MCNC: 3.9 G/DL (ref 3.4–5)
ALBUMIN/GLOB SERPL: 1.1 {RATIO} (ref 1–2)
ALP LIVER SERPL-CCNC: 60 U/L
ALT SERPL-CCNC: 15 U/L
ANION GAP SERPL CALC-SCNC: 5 MMOL/L (ref 0–18)
AST SERPL-CCNC: 11 U/L (ref 15–37)
B-HCG UR QL: NEGATIVE
BASOPHILS # BLD AUTO: 0.02 X10(3) UL (ref 0–0.2)
BASOPHILS NFR BLD AUTO: 0.2 %
BILIRUB SERPL-MCNC: 0.3 MG/DL (ref 0.1–2)
BILIRUB UR QL STRIP.AUTO: NEGATIVE
BUN BLD-MCNC: 6 MG/DL (ref 9–23)
CALCIUM BLD-MCNC: 8.5 MG/DL (ref 8.5–10.1)
CHLORIDE SERPL-SCNC: 107 MMOL/L (ref 98–112)
CLARITY UR REFRACT.AUTO: CLEAR
CO2 SERPL-SCNC: 26 MMOL/L (ref 21–32)
COLOR UR AUTO: YELLOW
CREAT BLD-MCNC: 0.76 MG/DL
EGFRCR SERPLBLD CKD-EPI 2021: 107 ML/MIN/1.73M2 (ref 60–?)
EOSINOPHIL # BLD AUTO: 0.07 X10(3) UL (ref 0–0.7)
EOSINOPHIL NFR BLD AUTO: 0.7 %
ERYTHROCYTE [DISTWIDTH] IN BLOOD BY AUTOMATED COUNT: 12.4 %
GLOBULIN PLAS-MCNC: 3.7 G/DL (ref 2.8–4.4)
GLUCOSE BLD-MCNC: 124 MG/DL (ref 70–99)
GLUCOSE UR STRIP.AUTO-MCNC: NEGATIVE MG/DL
HCT VFR BLD AUTO: 37.1 %
HGB BLD-MCNC: 12.7 G/DL
IMM GRANULOCYTES # BLD AUTO: 0.03 X10(3) UL (ref 0–1)
IMM GRANULOCYTES NFR BLD: 0.3 %
LEUKOCYTE ESTERASE UR QL STRIP.AUTO: NEGATIVE
LYMPHOCYTES # BLD AUTO: 2.42 X10(3) UL (ref 1–4)
LYMPHOCYTES NFR BLD AUTO: 24.8 %
MCH RBC QN AUTO: 33.3 PG (ref 26–34)
MCHC RBC AUTO-ENTMCNC: 34.2 G/DL (ref 31–37)
MCV RBC AUTO: 97.4 FL
MONOCYTES # BLD AUTO: 0.53 X10(3) UL (ref 0.1–1)
MONOCYTES NFR BLD AUTO: 5.4 %
NEUTROPHILS # BLD AUTO: 6.67 X10 (3) UL (ref 1.5–7.7)
NEUTROPHILS # BLD AUTO: 6.67 X10(3) UL (ref 1.5–7.7)
NEUTROPHILS NFR BLD AUTO: 68.6 %
NITRITE UR QL STRIP.AUTO: NEGATIVE
OSMOLALITY SERPL CALC.SUM OF ELEC: 285 MOSM/KG (ref 275–295)
PH UR STRIP.AUTO: 5.5 [PH] (ref 5–8)
PLATELET # BLD AUTO: 270 10(3)UL (ref 150–450)
POTASSIUM SERPL-SCNC: 3.6 MMOL/L (ref 3.5–5.1)
PROT SERPL-MCNC: 7.6 G/DL (ref 6.4–8.2)
PROT UR STRIP.AUTO-MCNC: NEGATIVE MG/DL
RBC # BLD AUTO: 3.81 X10(6)UL
RBC UR QL AUTO: NEGATIVE
SODIUM SERPL-SCNC: 138 MMOL/L (ref 136–145)
SP GR UR STRIP.AUTO: 1.02 (ref 1–1.03)
UROBILINOGEN UR STRIP.AUTO-MCNC: 0.2 MG/DL
WBC # BLD AUTO: 9.7 X10(3) UL (ref 4–11)

## 2024-04-18 PROCEDURE — 76856 US EXAM PELVIC COMPLETE: CPT | Performed by: NURSE PRACTITIONER

## 2024-04-18 PROCEDURE — 81003 URINALYSIS AUTO W/O SCOPE: CPT | Performed by: NURSE PRACTITIONER

## 2024-04-18 PROCEDURE — 76830 TRANSVAGINAL US NON-OB: CPT | Performed by: NURSE PRACTITIONER

## 2024-04-18 PROCEDURE — 80053 COMPREHEN METABOLIC PANEL: CPT | Performed by: NURSE PRACTITIONER

## 2024-04-18 PROCEDURE — 81025 URINE PREGNANCY TEST: CPT

## 2024-04-18 PROCEDURE — 85025 COMPLETE CBC W/AUTO DIFF WBC: CPT | Performed by: NURSE PRACTITIONER

## 2024-04-18 PROCEDURE — 36415 COLL VENOUS BLD VENIPUNCTURE: CPT

## 2024-04-18 PROCEDURE — 99285 EMERGENCY DEPT VISIT HI MDM: CPT

## 2024-04-18 PROCEDURE — 99284 EMERGENCY DEPT VISIT MOD MDM: CPT

## 2024-04-18 RX ORDER — OXCARBAZEPINE 600 MG/1
600 TABLET, FILM COATED ORAL 2 TIMES DAILY
COMMUNITY

## 2024-04-18 RX ORDER — HYDROCODONE BITARTRATE AND ACETAMINOPHEN 5; 325 MG/1; MG/1
1 TABLET ORAL EVERY 6 HOURS PRN
COMMUNITY

## 2024-04-18 RX ORDER — NAPROXEN 500 MG/1
500 TABLET ORAL 2 TIMES DAILY PRN
Qty: 20 TABLET | Refills: 0 | Status: SHIPPED | OUTPATIENT
Start: 2024-04-18 | End: 2024-04-25

## 2024-04-18 NOTE — ED INITIAL ASSESSMENT (HPI)
Pt reports LLQ abd pain that radiates to left groin x3 days.pt  has been taking norco and ibuprofen with out relief. Last BM today and pt reports it was normal. Not on menstrual cycle tamra.

## 2024-04-19 NOTE — ED PROVIDER NOTES
Patient Seen in: [facility] In [place]      History     Chief Complaint   Patient presents with    Abdomen/Flank Pain     Stated Complaint: left lower abd pain x 3 days ago    Subjective:   HPI    34-year-old female with history of endometriosis, epilepsy, back pain, ADHD presents today with complaints of left lower quadrant pain for 3 days.  Patient states the pain feels like it is stabbing.  Patient states that she has been taking Norco with no relief to the pain.  Patient denies any STI exposure.  Patient denies any STI history.  Patient states that her last period was .  Patient denies any fever or chills.  Patient states she has been having normal bowel movements.    Objective:   Past Medical History:    Endometriosis    Pelvic congestion syndrome              Past Surgical History:   Procedure Laterality Date    Laparoscopic assisted      varicose vein on ovaries    Other  1/10/14    laproscopic endometrial                Social History     Socioeconomic History    Marital status: Single   Tobacco Use    Smoking status: Former     Current packs/day: 0.00     Types: Cigarettes     Quit date: 2020     Years since quittin.2     Passive exposure: Past    Smokeless tobacco: Never   Vaping Use    Vaping status: Some Days   Substance and Sexual Activity    Alcohol use: No     Alcohol/week: 0.0 standard drinks of alcohol    Drug use: No   Other Topics Concern    Caffeine Concern Yes    Exercise Yes     Social Determinants of Health      Received from [facility]    Social Connections    Received from [facility]    Housing Stability              Review of Systems   Constitutional: Negative.    HENT: Negative.     Eyes: Negative.    Respiratory: Negative.     Cardiovascular: Negative.    Gastrointestinal:  Positive for abdominal pain.   Endocrine: Negative.    Genitourinary: Negative.    Musculoskeletal: Negative.    Skin: Negative.     Allergic/Immunologic: Negative.    Neurological: Negative.    Hematological: Negative.    Psychiatric/Behavioral: Negative.         Positive for stated complaint: left lower abd pain x 3 days ago  Other systems are as noted in HPI.  Constitutional and vital signs reviewed.      All other systems reviewed and negative except as noted above.    Physical Exam     ED Triage Vitals   BP 04/18/24 1859 (!) 121/94   Pulse 04/18/24 1859 (!) 137   Resp 04/18/24 1859 20   Temp 04/18/24 1859 98.4 °F (36.9 °C)   Temp src 04/18/24 1859 Oral   SpO2 04/18/24 1911 98 %   O2 Device 04/18/24 1911 None (Room air)       Current:BP (!) 121/94   Pulse 106   Temp 98.4 °F (36.9 °C) (Oral)   Resp 20   Ht 162.6 cm (5' 4\")   Wt 49.9 kg   LMP 03/31/2024 (Exact Date)   SpO2 98%   BMI 18.88 kg/m²         Physical Exam  Vitals and nursing note reviewed.   Constitutional:       Appearance: She is well-developed and normal weight.   HENT:      Head: Normocephalic.   Eyes:      Extraocular Movements: Extraocular movements intact.      Pupils: Pupils are equal, round, and reactive to light.   Cardiovascular:      Rate and Rhythm: Normal rate and regular rhythm.      Heart sounds: Normal heart sounds.   Pulmonary:      Effort: Pulmonary effort is normal.      Breath sounds: Normal breath sounds.   Abdominal:      General: Abdomen is flat. Bowel sounds are normal.      Palpations: Abdomen is soft.      Tenderness: There is abdominal tenderness in the left lower quadrant.   Neurological:      General: No focal deficit present.      Mental Status: She is alert.   Psychiatric:         Mood and Affect: Mood normal.             ED Course     Labs Reviewed   COMP METABOLIC PANEL (14) - Abnormal; Notable for the following components:       Result Value    Glucose 124 (*)     BUN 6 (*)     AST 11 (*)     All other components within normal limits   URINALYSIS, ROUTINE - Abnormal; Notable for the following components:    Ketones Urine Trace (*)     All  other components within normal limits   POCT PREGNANCY URINE - Normal   CBC WITH DIFFERENTIAL WITH PLATELET    Narrative:     The following orders were created for panel order CBC With Differential With Platelet.  Procedure                               Abnormality         Status                     ---------                               -----------         ------                     CBC W/ DIFFERENTIAL[[id number]]                              Final result                 Please view results for these tests on the individual orders.   CBC W/ DIFFERENTIAL                      MDM      34-year-old female with history of endometriosis, epilepsy, back pain, ADHD presents today with complaints of left lower quadrant pain for 3 days.  Patient states the pain feels like it is stabbing.  Patient states that she has been taking Norco with no relief to the pain.  Patient denies any STI exposure.  Patient denies any STI history.  Patient states that her last period was March 31.  Patient denies any fever or chills.  Patient states she has been having normal bowel movements.  Vital signs: Please see EMR.  Physical exam: Please see exam.  Differential diagnosis: Ovarian torsion, endometriosis, ovarian cyst, diverticulitis, cystitis.  Recent Results (from the past 24 hour(s))   Comp Metabolic Panel (14)    Collection Time: 04/18/24  7:10 PM   Result Value Ref Range    Glucose 124 (H) 70 - 99 mg/dL    Sodium 138 136 - 145 mmol/L    Potassium 3.6 3.5 - 5.1 mmol/L    Chloride 107 98 - 112 mmol/L    CO2 26.0 21.0 - 32.0 mmol/L    Anion Gap 5 0 - 18 mmol/L    BUN 6 (L) 9 - 23 mg/dL    Creatinine 0.76 0.55 - 1.02 mg/dL    Calcium, Total 8.5 8.5 - 10.1 mg/dL    Calculated Osmolality 285 275 - 295 mOsm/kg    eGFR-Cr 107 >=60 mL/min/1.73m2    AST 11 (L) 15 - 37 U/L    ALT 15 13 - 56 U/L    Alkaline Phosphatase 60 37 - 98 U/L    Bilirubin, Total 0.3 0.1 - 2.0 mg/dL    Total Protein 7.6 6.4 - 8.2 g/dL    Albumin 3.9 3.4 - 5.0 g/dL     Globulin  3.7 2.8 - 4.4 g/dL    A/G Ratio 1.1 1.0 - 2.0   CBC W/ DIFFERENTIAL    Collection Time: 04/18/24  7:10 PM   Result Value Ref Range    WBC 9.7 4.0 - 11.0 x10(3) uL    RBC 3.81 3.80 - 5.30 x10(6)uL    HGB 12.7 12.0 - 16.0 g/dL    HCT 37.1 35.0 - 48.0 %    .0 150.0 - 450.0 10(3)uL    MCV 97.4 80.0 - 100.0 fL    MCH 33.3 26.0 - 34.0 pg    MCHC 34.2 31.0 - 37.0 g/dL    RDW 12.4 %    Neutrophil Absolute Prelim 6.67 1.50 - 7.70 x10 (3) uL    Neutrophil Absolute 6.67 1.50 - 7.70 x10(3) uL    Lymphocyte Absolute 2.42 1.00 - 4.00 x10(3) uL    Monocyte Absolute 0.53 0.10 - 1.00 x10(3) uL    Eosinophil Absolute 0.07 0.00 - 0.70 x10(3) uL    Basophil Absolute 0.02 0.00 - 0.20 x10(3) uL    Immature Granulocyte Absolute 0.03 0.00 - 1.00 x10(3) uL    Neutrophil % 68.6 %    Lymphocyte % 24.8 %    Monocyte % 5.4 %    Eosinophil % 0.7 %    Basophil % 0.2 %    Immature Granulocyte % 0.3 %   Urinalysis, Routine    Collection Time: 04/18/24  7:23 PM   Result Value Ref Range    Urine Color Yellow Yellow    Clarity Urine Clear Clear    Spec Gravity 1.025 1.005 - 1.030    Glucose Urine Negative Negative mg/dL    Bilirubin Urine Negative Negative    Ketones Urine Trace (A) Negative mg/dL    Blood Urine Negative Negative    pH Urine 5.5 5.0 - 8.0    Protein Urine Negative Negative mg/dL    Urobilinogen Urine 0.2 <2.0 mg/dL    Nitrite Urine Negative Negative    Leukocyte Esterase Urine Negative Negative    Microscopic Microscopic not indicated    POCT Pregnancy, Urine    Collection Time: 04/18/24  7:24 PM   Result Value Ref Range    POCT Urine Pregnancy Negative Negative   US PELVIS (TRANSABDOMINAL AND TRANSVAGINAL) (CPT=76856/24874)    Result Date: 4/18/2024  CONCLUSION:  Unremarkable pelvic ultrasound.   LOCATION:  [facility]   Dictated by (CST): Junior Rodriguez MD on 4/18/2024 at 8:45 PM     Finalized by (CST): Junior Rodriguez MD on 4/18/2024 at 8:50 PM      Will diagnosed with pelvic congestion syndrome.  Patient is to follow-up  with her OB/GYN.  Will prescribe naproxen twice daily for 7 days.  ED precautions given.    Note to Patient  The 21st Century Cures Act makes medical notes like these available to patients in the interest of transparency. However, be advised this is a medical document and is intended as vbfy-yy-lyov communication; it is written in medical language and may appear blunt, direct, or contain abbreviations or verbiage that are unfamiliar. Medical documents are intended to carry relevant information, facts as evident, and the clinical opinion of the practitioner.     This report has been produced using speech recognition software, and may contain errors related to grammar, punctuation, spelling, words or phrases unrecognized or not translated appropriately to text; these errors may be referred to the dictating provider for further clarification and/or addendum as needed.                                     Medical Decision Making  34-year-old female with history of endometriosis, epilepsy, back pain, ADHD presents today with complaints of left lower quadrant pain for 3 days.  Patient states the pain feels like it is stabbing.  Patient states that she has been taking Norco with no relief to the pain.  Patient denies any STI exposure.  Patient denies any STI history.  Patient states that her last period was March 31.  Patient denies any fever or chills.  Patient states she has been having normal bowel movements.    Problems Addressed:  Pelvic congestion syndrome: acute illness or injury    Amount and/or Complexity of Data Reviewed  Labs: ordered. Decision-making details documented in ED Course.  Radiology: ordered. Decision-making details documented in ED Course.    Risk  Prescription drug management.        Disposition and Plan     Clinical Impression:  1. Pelvic congestion syndrome         Disposition:  Discharge  4/18/2024  9:04 pm    Follow-up:  Tierney Ling MD  [address] [address] [place] [address]  [id number]  [phone]    Follow up in 1 week(s)  ER follow up          Medications Prescribed:  Current Discharge Medication List        START taking these medications    Details   naproxen 500 MG Oral Tab Take 1 tablet (500 mg total) by mouth 2 (two) times daily as needed.  Qty: 20 tablet, Refills: 0

## 2024-06-24 PROBLEM — M54.6 CHRONIC BILATERAL THORACIC BACK PAIN: Status: ACTIVE | Noted: 2023-07-29

## 2024-06-24 PROBLEM — F98.8 ATTENTION DEFICIT DISORDER (ADD) IN ADULT: Status: ACTIVE | Noted: 2022-05-07

## 2024-06-24 PROBLEM — G89.29 CHRONIC BILATERAL THORACIC BACK PAIN: Status: ACTIVE | Noted: 2023-07-29

## 2024-06-24 PROBLEM — N94.89 PELVIC CONGESTION: Status: ACTIVE | Noted: 2022-04-27

## 2024-06-24 PROBLEM — N60.12 FIBROCYSTIC BREAST CHANGES OF BOTH BREASTS: Status: ACTIVE | Noted: 2022-05-07

## 2024-06-24 PROBLEM — N60.11 FIBROCYSTIC BREAST CHANGES OF BOTH BREASTS: Status: ACTIVE | Noted: 2022-05-07

## 2024-06-24 PROBLEM — N80.9 ENDOMETRIOSIS: Status: ACTIVE | Noted: 2022-05-07

## 2024-06-24 PROBLEM — F31.81 BIPOLAR 2 DISORDER (HCC): Status: ACTIVE | Noted: 2022-05-07

## 2024-06-24 PROBLEM — R56.9 SEIZURE (HCC): Status: ACTIVE | Noted: 2022-04-27

## 2024-06-25 ENCOUNTER — OFFICE VISIT (OUTPATIENT)
Dept: OBGYN CLINIC | Facility: CLINIC | Age: 34
End: 2024-06-25

## 2024-06-25 VITALS — BODY MASS INDEX: 19 KG/M2 | DIASTOLIC BLOOD PRESSURE: 76 MMHG | WEIGHT: 110 LBS | SYSTOLIC BLOOD PRESSURE: 136 MMHG

## 2024-06-25 DIAGNOSIS — N93.9 ABNORMAL UTERINE BLEEDING (AUB): ICD-10-CM

## 2024-06-25 DIAGNOSIS — N80.9 ENDOMETRIOSIS: ICD-10-CM

## 2024-06-25 DIAGNOSIS — R10.2 CHRONIC PELVIC PAIN IN FEMALE: Primary | ICD-10-CM

## 2024-06-25 DIAGNOSIS — N92.1 MENOMETRORRHAGIA: ICD-10-CM

## 2024-06-25 DIAGNOSIS — N94.89 PELVIC CONGESTION: ICD-10-CM

## 2024-06-25 DIAGNOSIS — G89.29 CHRONIC PELVIC PAIN IN FEMALE: Primary | ICD-10-CM

## 2024-06-25 PROCEDURE — 99204 OFFICE O/P NEW MOD 45 MIN: CPT | Performed by: OBSTETRICS & GYNECOLOGY

## 2024-06-25 RX ORDER — ESCITALOPRAM OXALATE 20 MG/1
20 TABLET ORAL DAILY
COMMUNITY
Start: 2024-06-06

## 2024-06-25 RX ORDER — BACLOFEN 10 MG/1
1 TABLET ORAL DAILY
COMMUNITY
Start: 2024-01-26

## 2024-06-25 RX ORDER — OXCARBAZEPINE 300 MG/1
2 TABLET, FILM COATED ORAL 2 TIMES DAILY
COMMUNITY
Start: 2023-12-08

## 2024-06-25 RX ORDER — HYDROCODONE BITARTRATE AND ACETAMINOPHEN 10; 325 MG/1; MG/1
1 TABLET ORAL EVERY 4 HOURS PRN
COMMUNITY
Start: 2024-01-31

## 2024-06-25 RX ORDER — DEXTROAMPHETAMINE SACCHARATE, AMPHETAMINE ASPARTATE, DEXTROAMPHETAMINE SULFATE AND AMPHETAMINE SULFATE 2.5; 2.5; 2.5; 2.5 MG/1; MG/1; MG/1; MG/1
TABLET ORAL
COMMUNITY
Start: 2024-06-07

## 2024-06-25 RX ORDER — NALOXONE HYDROCHLORIDE 4 MG/.1ML
1 SPRAY NASAL AS NEEDED
COMMUNITY
Start: 2022-04-17

## 2024-06-25 NOTE — PROGRESS NOTES
Subjective:  Chief Complaint   Patient presents with    Other     Pelvic pain, previous doc told her \"varicose veins in pelvic\", periods are all clots, extremely heavy periods, bloating really badly    OCP doesn't work,      34 year old female  presents for a complaint of chronic pelvic pain and heavy irregular prolonged menstrual bleeding.  Pain issues for past 11 years since having laparoscopy and being diagnosed with severe endometriosis.  Tried Depo but had frequent bleeding.  Tried OCP but had bad mood symptoms.  No recent treatment.  Also diagnosed with pelvic congestion on ultrasound.  Menses monthly, duration 7 days, heavy for several days, and also has episodes with  7 day menses, stops bleeding, and then will bleed several more days.  Intermittent pain with intercourse.  Unprotected sex for many years without pregnancy, although not actively trying.  Pain is disabling.  Bilateral lower abdomen and back.  Surgery was 2013 at [facility]    Patient's last menstrual period was 2024 (exact date).  Hx Prior Abnormal Pap: No  Pap Date:  (pt doesnt remeber last pap)  Menarche: 13 (2024  3:41 PM)  Period Cycle (Days): 28-30 days, sometimes 2 times a month, sometimes bleeding more a month then not (2024  3:41 PM)  Period Duration (Days): 7-9 days (2024  3:41 PM)  Period Flow: clots only really (2024  3:41 PM)  Use of Birth Control (if yes, specify type): None (2024  3:41 PM)  Hx Prior Abnormal Pap: No (2024  3:41 PM)  Pap Date:  (pt doesnt remeber last pap) (2024  3:41 PM)    There is no immunization history on file for this patient.   reports that she quit smoking about 4 years ago. Her smoking use included cigarettes. She has been exposed to tobacco smoke. She has never used smokeless tobacco.   reports no history of alcohol use.    Past Medical History:    Endometriosis    Epilepsy (HCC)    Pelvic congestion syndrome     Past Surgical History:   Procedure  Laterality Date    Laparoscopic assisted      varicose vein on ovaries    Other  1/10/14    laproscopic endometrial     Review of Systems:  Pertinent items are noted in the HPI.    Objective:  /76   Wt 110 lb (49.9 kg)   LMP 06/03/2024 (Exact Date)   BMI 18.88 kg/m²    Physical Examination:  General appearance: Well dressed, well nourished in no apparent distress  Neurologic/Psychiatric: Alert and oriented to person, place and time, mood normal, affect appropriate  Head: Normocephalic without obvious deformity, atraumatic  Neck: No thyromegaly, supple, non-tender, no masses, no adenopathy  Lungs: Clear to auscultation bilaterally, no rales, wheezes or rhonchi  Breasts: Symmetric, non-tender, no masses, lesions, retraction, dimpling or discharge bilaterally, no axillary or supraclavicular lymphadenopathy  Heart:: Regular rate and rhythm, no gallops or murmurs  Abdomen: Soft, non-tender, non-distended, no masses, no hepatosplenomegaly, no hernias, no inguinal lymphadenopathy  Pelvic:    External genitalia- Normal, Bartholin's, urethra, skeins glands normal   Vagina- No vaginal lesions, no discharge   Cervix- No lesions, long/closed, no cervical motion tenderness   Uterus- Normal sized, anteverted, non-tender, no masses   Adnexa-  Non-tender, no masses  Extremities: Non-tender, full range of motion, no clubbing, cyanosis or edema  Skin:  General inspection- no rashes, lesions or discoloration  Rectum: No hemorrhoids, no masses.    Ultrasound 4/18/24  PROCEDURE:  US PELVIS (TRANSABDOMINAL AND TRANSVAGINAL) (CPT=76856/89048)  COMPARISON:  [facility], US PELVIS EV W DOPPLER (CPT=93975/63160)+(EDW 90985), 1/01/2016, 10:46 PM.  INDICATIONS:  left lower abd pain x 3 days ago  TECHNIQUE:  Pelvic ultrasound using transabdominal and endovaginal technique.  Transvaginal ultrasound was used to better evaluate adnexal and endometrial detail.  PATIENT STATED HISTORY: (As transcribed by Technologist)  Patient presents  with left lower abdominal pain.  FINDINGS:        UTERUS:  8.1 x 3.8 x 4.4 cm    Endometrium Thickness:  5 mm and is unremarkable.    The uterus appears normal in size, shape, and echogenicity.  RIGHT OVARY:  3.39 cm x 1.57 cm x 2.79 cm    The right ovary appears normal in size, shape, and echogenicity. No significant masses are identified.  LEFT OVARY:  4.1 x 1.4 x 3.1 cm    The left ovary appears normal in size, shape, and echogenicity. No significant masses are identified.  CUL-DE-SAC:  Normal.  No fluid or mass.    OTHER:  There are small varicosities within the pelvis.   Impression   CONCLUSION:  Unremarkable pelvic ultrasound.     Assessment/Plan:  Chronic pelvic pain/endometriosis- check op report from laparoscopy.  No improvement with OCP/Depo and bothersome side effects.  Patient is a good candidate for Orlissa.  Would recommend trial of Orlissa before considering hysterectomy due to age.    Abnormal uterine bleeding/menometrorrhagia- check endometrial biopsy.  Also needs STD screen and Pap.  Pelvic congestion- counseled regarding this finding, clinical significance or lack of significance as a cause of chronic pelvic pain.    Accepted chaperone for exam today     Diagnoses and all orders for this visit:    Chronic pelvic pain in female    Endometriosis    Abnormal uterine bleeding (AUB)    Menometrorrhagia    Pelvic congestion       Return for EMB next available..      Total patient time was 45 minutes in reviewing paper/electronic records, reviewing test results from outside care provider, obtaining history, evaluating the patient, consultation, discussing treatment options, coordination of care and completing documentation. This included face to face and non-face to face actions. The patient's questions and concerns were addressed.

## 2024-06-26 ENCOUNTER — TELEPHONE (OUTPATIENT)
Dept: OBGYN CLINIC | Facility: CLINIC | Age: 34
End: 2024-06-26

## 2024-06-26 NOTE — TELEPHONE ENCOUNTER
Pt completed SHANAE to obtain records from [facility].    Faxed to [phone].   Copy in plfd    Awaiting records

## 2024-06-29 NOTE — TELEPHONE ENCOUNTER
Records received from [facility] and placed in Dr FLORES bin in [facility] for review.

## 2024-07-02 ENCOUNTER — MED REC SCAN ONLY (OUTPATIENT)
Dept: OBGYN CLINIC | Facility: CLINIC | Age: 34
End: 2024-07-02

## 2024-07-31 ENCOUNTER — TELEPHONE (OUTPATIENT)
Dept: OBGYN CLINIC | Facility: CLINIC | Age: 34
End: 2024-07-31

## 2024-07-31 NOTE — TELEPHONE ENCOUNTER
Voicemail left for patient to return our call. Office phone number provided.   Patient can still have EMB while on menses if she feels well enough to come in.  No  is needed.

## 2024-07-31 NOTE — TELEPHONE ENCOUNTER
Patient had to reschedule EMB for today due to getting period last night.  The patient r/s'd appt to 10/01 but wants to know if she can drive after the procedure or would if she need to have someone drive her home,   Please advise.

## 2024-08-16 ENCOUNTER — HOSPITAL ENCOUNTER (EMERGENCY)
Age: 34
Discharge: HOME | End: 2024-08-16
Attending: EMERGENCY MEDICINE
Payer: MEDICAID

## 2024-08-16 ENCOUNTER — APPOINTMENT (OUTPATIENT)
Dept: CT IMAGING | Age: 34
End: 2024-08-16
Attending: EMERGENCY MEDICINE
Payer: MEDICAID

## 2024-08-16 ENCOUNTER — APPOINTMENT (OUTPATIENT)
Dept: GENERAL RADIOLOGY | Age: 34
End: 2024-08-16
Attending: EMERGENCY MEDICINE
Payer: MEDICAID

## 2024-08-16 VITALS
HEIGHT: 64 IN | WEIGHT: 108 LBS | RESPIRATION RATE: 16 BRPM | HEART RATE: 117 BPM | BODY MASS INDEX: 18.44 KG/M2 | TEMPERATURE: 100 F | OXYGEN SATURATION: 98 % | DIASTOLIC BLOOD PRESSURE: 68 MMHG | SYSTOLIC BLOOD PRESSURE: 110 MMHG

## 2024-08-16 DIAGNOSIS — R51.9 ACUTE NONINTRACTABLE HEADACHE, UNSPECIFIED HEADACHE TYPE: Primary | ICD-10-CM

## 2024-08-16 DIAGNOSIS — R52 WHOLE BODY PAIN: ICD-10-CM

## 2024-08-16 DIAGNOSIS — M62.838 SPASM OF MUSCLE: ICD-10-CM

## 2024-08-16 LAB
ALBUMIN SERPL-MCNC: 4.3 G/DL (ref 3.4–5)
ALBUMIN/GLOB SERPL: 1.1 {RATIO} (ref 1–2)
ALP LIVER SERPL-CCNC: 63 U/L
ALT SERPL-CCNC: 18 U/L
ANION GAP SERPL CALC-SCNC: 7 MMOL/L (ref 0–18)
AST SERPL-CCNC: 9 U/L (ref 15–37)
B-HCG UR QL: NEGATIVE
BASOPHILS # BLD AUTO: 0.03 X10(3) UL (ref 0–0.2)
BASOPHILS NFR BLD AUTO: 0.3 %
BILIRUB SERPL-MCNC: 0.5 MG/DL (ref 0.1–2)
BILIRUB UR QL STRIP.AUTO: NEGATIVE
BUN BLD-MCNC: 12 MG/DL (ref 9–23)
CALCIUM BLD-MCNC: 9.7 MG/DL (ref 8.5–10.1)
CHLORIDE SERPL-SCNC: 103 MMOL/L (ref 98–112)
CK SERPL-CCNC: 31 U/L
CLARITY UR REFRACT.AUTO: CLEAR
CO2 SERPL-SCNC: 25 MMOL/L (ref 21–32)
COLOR UR AUTO: YELLOW
CREAT BLD-MCNC: 0.92 MG/DL
D DIMER PPP FEU-MCNC: 0.44 UG/ML FEU (ref ?–0.5)
EGFRCR SERPLBLD CKD-EPI 2021: 85 ML/MIN/1.73M2 (ref 60–?)
EOSINOPHIL # BLD AUTO: 0.12 X10(3) UL (ref 0–0.7)
EOSINOPHIL NFR BLD AUTO: 1.1 %
ERYTHROCYTE [DISTWIDTH] IN BLOOD BY AUTOMATED COUNT: 12.4 %
GLOBULIN PLAS-MCNC: 3.9 G/DL (ref 2.8–4.4)
GLUCOSE BLD-MCNC: 108 MG/DL (ref 70–99)
GLUCOSE UR STRIP.AUTO-MCNC: NEGATIVE MG/DL
HCG SERPL QL: NEGATIVE
HCT VFR BLD AUTO: 38.9 %
HGB BLD-MCNC: 13.9 G/DL
IMM GRANULOCYTES # BLD AUTO: 0.04 X10(3) UL (ref 0–1)
IMM GRANULOCYTES NFR BLD: 0.4 %
LEUKOCYTE ESTERASE UR QL STRIP.AUTO: NEGATIVE
LYMPHOCYTES # BLD AUTO: 2.44 X10(3) UL (ref 1–4)
LYMPHOCYTES NFR BLD AUTO: 22.4 %
MCH RBC QN AUTO: 33.3 PG (ref 26–34)
MCHC RBC AUTO-ENTMCNC: 35.7 G/DL (ref 31–37)
MCV RBC AUTO: 93.1 FL
MONOCYTES # BLD AUTO: 0.69 X10(3) UL (ref 0.1–1)
MONOCYTES NFR BLD AUTO: 6.3 %
NEUTROPHILS # BLD AUTO: 7.59 X10 (3) UL (ref 1.5–7.7)
NEUTROPHILS # BLD AUTO: 7.59 X10(3) UL (ref 1.5–7.7)
NEUTROPHILS NFR BLD AUTO: 69.5 %
NITRITE UR QL STRIP.AUTO: NEGATIVE
OSMOLALITY SERPL CALC.SUM OF ELEC: 280 MOSM/KG (ref 275–295)
PH UR STRIP.AUTO: 8.5 [PH] (ref 5–8)
PLATELET # BLD AUTO: 315 10(3)UL (ref 150–450)
POTASSIUM SERPL-SCNC: 3.3 MMOL/L (ref 3.5–5.1)
PROT SERPL-MCNC: 8.2 G/DL (ref 6.4–8.2)
PROT UR STRIP.AUTO-MCNC: NEGATIVE MG/DL
RBC # BLD AUTO: 4.18 X10(6)UL
RBC UR QL AUTO: NEGATIVE
SODIUM SERPL-SCNC: 135 MMOL/L (ref 136–145)
SP GR UR STRIP.AUTO: 1.02 (ref 1–1.03)
TROPONIN I SERPL HS-MCNC: <3 NG/L
UROBILINOGEN UR STRIP.AUTO-MCNC: 1 MG/DL
WBC # BLD AUTO: 10.9 X10(3) UL (ref 4–11)

## 2024-08-16 PROCEDURE — 99285 EMERGENCY DEPT VISIT HI MDM: CPT

## 2024-08-16 PROCEDURE — 96374 THER/PROPH/DIAG INJ IV PUSH: CPT

## 2024-08-16 PROCEDURE — 93005 ELECTROCARDIOGRAM TRACING: CPT

## 2024-08-16 PROCEDURE — 71045 X-RAY EXAM CHEST 1 VIEW: CPT | Performed by: EMERGENCY MEDICINE

## 2024-08-16 PROCEDURE — 81003 URINALYSIS AUTO W/O SCOPE: CPT | Performed by: EMERGENCY MEDICINE

## 2024-08-16 PROCEDURE — 82550 ASSAY OF CK (CPK): CPT | Performed by: EMERGENCY MEDICINE

## 2024-08-16 PROCEDURE — 85379 FIBRIN DEGRADATION QUANT: CPT | Performed by: EMERGENCY MEDICINE

## 2024-08-16 PROCEDURE — 93010 ELECTROCARDIOGRAM REPORT: CPT

## 2024-08-16 PROCEDURE — 96376 TX/PRO/DX INJ SAME DRUG ADON: CPT

## 2024-08-16 PROCEDURE — 84703 CHORIONIC GONADOTROPIN ASSAY: CPT | Performed by: EMERGENCY MEDICINE

## 2024-08-16 PROCEDURE — 81025 URINE PREGNANCY TEST: CPT

## 2024-08-16 PROCEDURE — 70450 CT HEAD/BRAIN W/O DYE: CPT | Performed by: EMERGENCY MEDICINE

## 2024-08-16 PROCEDURE — 85025 COMPLETE CBC W/AUTO DIFF WBC: CPT | Performed by: EMERGENCY MEDICINE

## 2024-08-16 PROCEDURE — 80053 COMPREHEN METABOLIC PANEL: CPT | Performed by: EMERGENCY MEDICINE

## 2024-08-16 PROCEDURE — 96375 TX/PRO/DX INJ NEW DRUG ADDON: CPT

## 2024-08-16 PROCEDURE — 96361 HYDRATE IV INFUSION ADD-ON: CPT

## 2024-08-16 PROCEDURE — 84484 ASSAY OF TROPONIN QUANT: CPT | Performed by: EMERGENCY MEDICINE

## 2024-08-16 RX ORDER — DIAZEPAM 2 MG/1
2 TABLET ORAL EVERY 12 HOURS PRN
Qty: 6 TABLET | Refills: 0 | Status: SHIPPED | OUTPATIENT
Start: 2024-08-16 | End: 2024-08-19

## 2024-08-16 RX ORDER — DIPHENHYDRAMINE HYDROCHLORIDE 50 MG/ML
12.5 INJECTION INTRAMUSCULAR; INTRAVENOUS ONCE
Status: COMPLETED | OUTPATIENT
Start: 2024-08-16 | End: 2024-08-16

## 2024-08-16 RX ORDER — KETOROLAC TROMETHAMINE 15 MG/ML
15 INJECTION, SOLUTION INTRAMUSCULAR; INTRAVENOUS ONCE
Status: COMPLETED | OUTPATIENT
Start: 2024-08-16 | End: 2024-08-16

## 2024-08-16 RX ORDER — METOCLOPRAMIDE HYDROCHLORIDE 5 MG/ML
5 INJECTION INTRAMUSCULAR; INTRAVENOUS ONCE
Status: COMPLETED | OUTPATIENT
Start: 2024-08-16 | End: 2024-08-16

## 2024-08-16 RX ORDER — LORAZEPAM 2 MG/ML
1 INJECTION INTRAMUSCULAR ONCE
Status: COMPLETED | OUTPATIENT
Start: 2024-08-16 | End: 2024-08-16

## 2024-08-16 RX ORDER — LORAZEPAM 2 MG/ML
0.5 INJECTION INTRAMUSCULAR ONCE
Status: COMPLETED | OUTPATIENT
Start: 2024-08-16 | End: 2024-08-16

## 2024-08-16 NOTE — ED INITIAL ASSESSMENT (HPI)
Patient presents with migraine, right hand cramping and pain, dizziness, and blurred vision for the past four days.

## 2024-08-17 LAB
ATRIAL RATE: 102 BPM
P AXIS: 70 DEGREES
P-R INTERVAL: 128 MS
Q-T INTERVAL: 330 MS
QRS DURATION: 70 MS
QTC CALCULATION (BEZET): 430 MS
R AXIS: 83 DEGREES
T AXIS: 17 DEGREES
VENTRICULAR RATE: 102 BPM

## 2024-08-17 NOTE — DISCHARGE INSTRUCTIONS
Follow-up with a primary care doctor in the next week for reassessment.  Follow-up with a neurologist in the next several weeks to establish care.  Follow-up with a pain specialist as well.  Return for any concerning symptoms.  Do not drive or perform any other high risk activity until cleared by neurologist.  Take the medication as prescribed.  It can make you drowsy, so be careful when taking it.

## 2024-08-17 NOTE — ED QUICK NOTES
Patients boyfriend states pt had a shaking episode that lasted approximately 20 seconds, pt grabbed onto the siderail and arched her back, boyfriend was able to comfort her during episode and upon RN arrival to room immediately after episode pt was alert and oriented, answering question appropriately. MD mann

## 2024-08-17 NOTE — ED PROVIDER NOTES
Patient Seen in: [facility] [place]      History     Chief Complaint   Patient presents with    Headache    Numbness Weakness     Stated Complaint: headache and tingling and numbness right shoulder  to right hand 12 days    Subjective:   HPI    34-year-old female with past medical history of migraines, epilepsy, bipolar disorder, ADD presents today for evaluation.  On , patient was returning from a wedding.  She began having a migraine.  She had associated photophobia and nausea without any vomiting.  Since that time, she has experienced hot and cold flashes.  She has experienced intermittent hand spasm along with paresthesias as well.  She also has had a pain near her right armpit along with some shortness of breath.  She denies any chest pain, leg swelling, fevers or cough.  She has been taking Excedrin and Tylenol without relief.  She now presents for evaluation.  Patient vapes tobacco products.  She denies any drug or alcohol use.    Objective:   Past Medical History:    Endometriosis    Epilepsy (HCC)    Pelvic congestion syndrome              Past Surgical History:   Procedure Laterality Date    Laparoscopic assisted  01/10/2014    varicose vein on ovaries, minimal endometriotic lesions cul de sac, normal ovaries/tubes    Other  01/10/2014    laproscopic endometrial                Social History     Socioeconomic History    Marital status: Single   Tobacco Use    Smoking status: Former     Current packs/day: 0.00     Types: Cigarettes     Quit date: 2020     Years since quittin.5     Passive exposure: Past    Smokeless tobacco: Never   Vaping Use    Vaping status: Some Days   Substance and Sexual Activity    Alcohol use: No     Alcohol/week: 0.0 standard drinks of alcohol    Drug use: No    Sexual activity: Yes     Partners: Male   Other Topics Concern    Caffeine Concern Yes    Exercise Yes     Social Determinants of Health      Received from [facility]  [place]    Social Connections    Received from [facility]    Housing Stability              Review of Systems    Positive for stated Chief Complaint: Headache and Numbness Weakness    Other systems are as noted in HPI.  Constitutional and vital signs reviewed.      All other systems reviewed and negative except as noted above.    Physical Exam     ED Triage Vitals [08/16/24 1825]   /80   Pulse (!) 146   Resp 22   Temp 99.9 °F (37.7 °C)   Temp src Temporal   SpO2 100 %   O2 Device None (Room air)       Current Vitals:   Vital Signs  BP: 108/60  Pulse: 78  Resp: 18  Temp: 99.9 °F (37.7 °C)  Temp src: Temporal    Oxygen Therapy  SpO2: 98 %  O2 Device: None (Room air)            Physical Exam  Vitals and nursing note reviewed.   Constitutional:       Appearance: Normal appearance.   HENT:      Head: Normocephalic and atraumatic.      Nose: Nose normal.      Mouth/Throat:      Mouth: Mucous membranes are moist.   Eyes:      Extraocular Movements: Extraocular movements intact.      Pupils: Pupils are equal, round, and reactive to light.   Cardiovascular:      Rate and Rhythm: Normal rate and regular rhythm.   Pulmonary:      Effort: Pulmonary effort is normal.      Breath sounds: Normal breath sounds.   Abdominal:      Palpations: Abdomen is soft.      Tenderness: There is no abdominal tenderness.   Musculoskeletal:         General: Normal range of motion.      Cervical back: Neck supple.   Skin:     General: Skin is warm.   Neurological:      General: No focal deficit present.      Mental Status: She is alert.      Cranial Nerves: No cranial nerve deficit.      Sensory: No sensory deficit.      Motor: No weakness.      Coordination: Coordination normal.   Psychiatric:         Mood and Affect: Mood normal.             ED Course     Labs Reviewed   COMP METABOLIC PANEL (14) - Abnormal; Notable for the following components:       Result Value    Glucose 108 (*)     Sodium 135 (*)     Potassium 3.3  (*)     AST 9 (*)     All other components within normal limits   URINALYSIS WITH CULTURE REFLEX - Abnormal; Notable for the following components:    Ketones Urine Trace (*)     pH Urine 8.5 (*)     Urobilinogen Urine 1.0 (*)     All other components within normal limits   D-DIMER - Normal   CK CREATINE KINASE (NOT CREATININE) - Normal   HCG, BETA SUBUNIT, QUAL - Normal   TROPONIN I HIGH SENSITIVITY - Normal   POCT PREGNANCY URINE - Normal   CBC WITH DIFFERENTIAL WITH PLATELET     EKG    Rate, intervals and axes as noted on EKG Report.  Rate: 102  Rhythm: Sinus Rhythm  Reading: Nonspecific inferior and anterior T wave inversion, no STEMI                 CT BRAIN OR HEAD (CPT=70450)    Result Date: 8/16/2024  PROCEDURE:  CT BRAIN OR HEAD (68825)  COMPARISON:  [facility] , CT, CT BRAIN OR HEAD (68909), 9/08/2017, 6:56 PM.  INDICATIONS:  headache and tingling and numbness right shoulder  to right hand 12 days  TECHNIQUE:  Noncontrast CT scanning is performed through the brain. Dose reduction techniques were used. Dose information is transmitted to the ACR (American College of Radiology) NRDR (National Radiology Data Registry) which includes the Dose Index Registry.  PATIENT STATED HISTORY: (As transcribed by Technologist)   headache and tingling and numbness right shoulder  to right hand 12 days .    FINDINGS:  VENTRICLES/SULCI:  Ventricles and sulci are normal in size.  INTRACRANIAL:  There are no abnormal extraaxial fluid collections.  There is no midline shift.  There are no intraparenchymal brain abnormalities.  There is nothing specific for acute infarct.  There is no hemorrhage or mass lesion.  SINUSES:           No sign of acute sinusitis.  MASTOIDS:          No sign of acute inflammation. SKULL:             No evidence for fracture or osseous abnormality. OTHER:             None.            CONCLUSION:  No acute intracranial abnormality.    LOCATION:  [facility]   Dictated by (CST): Berto Li MD on 8/16/2024 at  9:00 PM     Finalized by (CST): Berto Li MD on 8/16/2024 at 9:02 PM       XR CHEST AP PORTABLE  (CPT=71045)    Result Date: 8/16/2024  PROCEDURE:  XR CHEST AP PORTABLE  (CPT=71045)  TECHNIQUE:  AP chest radiograph was obtained.  COMPARISON:  [facility], XR, XR CHEST PA + LAT CHEST (CPT=71046), 11/10/2019, 11:39 AM.  INDICATIONS:  headache and tingling and numbness right shoulder  to right hand 12 days  PATIENT STATED HISTORY: (As transcribed by Technologist)  Headache, tingling/numbness right upper chest/shoulder region for past 12 days.    FINDINGS:  Lungs and pleural spaces are clear.  Cardiac size is within normal limits.  Mediastinum and francheska are unremarkable.  Chest wall structures are unremarkable.            CONCLUSION:  There is no evidence of active cardiopulmonary disease on this single portable chest radiograph.   LOCATION:  [facility]      Dictated by (CST): Dao Daley MD on 8/16/2024 at 7:58 PM     Finalized by (CST): Dao Daley MD on 8/16/2024 at 7:58 PM               MDM      Differential Diagnosis  34-year-old female presents today for evaluation of headaches, right axillary pain, shortness of breath, bilateral carpal spasm with paresthesias, shortness of breath along with nausea that has been ongoing now for the past several weeks.  Patient is afebrile, hemodynamically stable in no acute distress during my assessment.  Her neuroexam is unremarkable.  Her lungs are grossly clear and she has no increased work of breathing.  Plan for CT of the head to assess for intracranial hemorrhage or mass.  Will obtain chest x-ray assess for pneumothorax, consolidation, edema or effusion.  Plan for troponin assess for signs of myocardial ischemia and D-dimer rule out pulmonary embolism.  Will obtain basic labs.  Will treat her symptomatically and reassess.    10:15 pm  Patient's ED workup is reassuring.  On reassessment, patient remains well-appearing.  She reports some improvement to her symptoms following  treatment in the ED.  With the patient's good clinical appearance and reassuring workup, I do feel she is stable for discharge home.  Patient further discusses her whole body pain.  She states she was prescribed Norco 3 times a day for it.  Additionally, she states she tries over-the-counter pain medicine without relief.  When I brought up the possibility of gabapentin for neuropathy, she states she had been prescribed that for the pain in the past without any success.  I suspect based on the conversation that her symptoms may be more chronic than the 3 weeks initially she had mentioned.  I advised patient to follow-up with a neurologist to establish care.  I additionally asked that she follow-up with a pain specialist as well.  She vocalized understanding and feels comfortable plan, will DC.                                   Medical Decision Making      Disposition and Plan     Clinical Impression:  1. Acute nonintractable headache, unspecified headache type    2. Whole body pain    3. Spasm of muscle         Disposition:  Discharge  8/16/2024 10:17 pm    Follow-up:  [facility]  [address]  [facility] [id number] [phone]  Follow up      Juan Zarate MD  [address]  [facility] [address] [phone]          [facility]  [address] [place] [id number] [phone]  Call   An EEG and MRI have been ordered. Call office and choose option 1 for general neurology and state that you are following up for Seizure          Medications Prescribed:  Current Discharge Medication List        START taking these medications    Details   diazePAM 2 MG Oral Tab Take 1 tablet (2 mg total) by mouth every 12 (twelve) hours as needed for Anxiety (spasm).  Qty: 6 tablet, Refills: 0    Associated Diagnoses: Spasm of muscle

## 2024-08-18 ENCOUNTER — HOSPITAL ENCOUNTER (EMERGENCY)
Facility: HOSPITAL | Age: 34
Discharge: HOME | End: 2024-08-18
Attending: EMERGENCY MEDICINE
Payer: MEDICAID

## 2024-08-18 VITALS
HEIGHT: 64 IN | RESPIRATION RATE: 20 BRPM | BODY MASS INDEX: 18.61 KG/M2 | DIASTOLIC BLOOD PRESSURE: 88 MMHG | SYSTOLIC BLOOD PRESSURE: 123 MMHG | OXYGEN SATURATION: 100 % | HEART RATE: 76 BPM | TEMPERATURE: 99 F | WEIGHT: 109 LBS

## 2024-08-18 DIAGNOSIS — G43.809 OTHER MIGRAINE WITHOUT STATUS MIGRAINOSUS, NOT INTRACTABLE: Primary | ICD-10-CM

## 2024-08-18 LAB
ALBUMIN SERPL-MCNC: 4.9 G/DL (ref 3.2–4.8)
ALBUMIN/GLOB SERPL: 1.7 {RATIO} (ref 1–2)
ALP LIVER SERPL-CCNC: 64 U/L
ALT SERPL-CCNC: 9 U/L
ANION GAP SERPL CALC-SCNC: 8 MMOL/L (ref 0–18)
AST SERPL-CCNC: 10 U/L (ref ?–34)
ATRIAL RATE: 128 BPM
BASOPHILS # BLD AUTO: 0.04 X10(3) UL (ref 0–0.2)
BASOPHILS NFR BLD AUTO: 0.4 %
BILIRUB SERPL-MCNC: 0.5 MG/DL (ref 0.3–1.2)
BUN BLD-MCNC: 9 MG/DL (ref 9–23)
CALCIUM BLD-MCNC: 9.6 MG/DL (ref 8.7–10.4)
CHLORIDE SERPL-SCNC: 107 MMOL/L (ref 98–112)
CO2 SERPL-SCNC: 23 MMOL/L (ref 21–32)
CREAT BLD-MCNC: 0.81 MG/DL
EGFRCR SERPLBLD CKD-EPI 2021: 99 ML/MIN/1.73M2 (ref 60–?)
EOSINOPHIL # BLD AUTO: 0.18 X10(3) UL (ref 0–0.7)
EOSINOPHIL NFR BLD AUTO: 1.8 %
ERYTHROCYTE [DISTWIDTH] IN BLOOD BY AUTOMATED COUNT: 11.9 %
FLUAV + FLUBV RNA SPEC NAA+PROBE: NEGATIVE
FLUAV + FLUBV RNA SPEC NAA+PROBE: NEGATIVE
GLOBULIN PLAS-MCNC: 2.9 G/DL (ref 2–3.5)
GLUCOSE BLD-MCNC: 108 MG/DL (ref 70–99)
HCT VFR BLD AUTO: 36.1 %
HGB BLD-MCNC: 13 G/DL
IMM GRANULOCYTES # BLD AUTO: 0.02 X10(3) UL (ref 0–1)
IMM GRANULOCYTES NFR BLD: 0.2 %
LYMPHOCYTES # BLD AUTO: 3.06 X10(3) UL (ref 1–4)
LYMPHOCYTES NFR BLD AUTO: 30.3 %
MCH RBC QN AUTO: 33.1 PG (ref 26–34)
MCHC RBC AUTO-ENTMCNC: 36 G/DL (ref 31–37)
MCV RBC AUTO: 91.9 FL
MONOCYTES # BLD AUTO: 0.89 X10(3) UL (ref 0.1–1)
MONOCYTES NFR BLD AUTO: 8.8 %
NEUTROPHILS # BLD AUTO: 5.92 X10 (3) UL (ref 1.5–7.7)
NEUTROPHILS # BLD AUTO: 5.92 X10(3) UL (ref 1.5–7.7)
NEUTROPHILS NFR BLD AUTO: 58.5 %
OSMOLALITY SERPL CALC.SUM OF ELEC: 285 MOSM/KG (ref 275–295)
P-R INTERVAL: 112 MS
PLATELET # BLD AUTO: 308 10(3)UL (ref 150–450)
POTASSIUM SERPL-SCNC: 3.6 MMOL/L (ref 3.5–5.1)
PROT SERPL-MCNC: 7.8 G/DL (ref 5.7–8.2)
Q-T INTERVAL: 398 MS
QRS DURATION: 70 MS
QTC CALCULATION (BEZET): 581 MS
R AXIS: 74 DEGREES
RBC # BLD AUTO: 3.93 X10(6)UL
RSV RNA SPEC NAA+PROBE: NEGATIVE
SARS-COV-2 RNA RESP QL NAA+PROBE: NOT DETECTED
SODIUM SERPL-SCNC: 138 MMOL/L (ref 136–145)
T AXIS: 55 DEGREES
VENTRICULAR RATE: 128 BPM
WBC # BLD AUTO: 10.1 X10(3) UL (ref 4–11)

## 2024-08-18 PROCEDURE — 96376 TX/PRO/DX INJ SAME DRUG ADON: CPT

## 2024-08-18 PROCEDURE — 0241U SARS-COV-2/FLU A AND B/RSV BY PCR (GENEXPERT): CPT | Performed by: EMERGENCY MEDICINE

## 2024-08-18 PROCEDURE — 96372 THER/PROPH/DIAG INJ SC/IM: CPT

## 2024-08-18 PROCEDURE — 99284 EMERGENCY DEPT VISIT MOD MDM: CPT

## 2024-08-18 PROCEDURE — 96374 THER/PROPH/DIAG INJ IV PUSH: CPT

## 2024-08-18 PROCEDURE — 85025 COMPLETE CBC W/AUTO DIFF WBC: CPT | Performed by: EMERGENCY MEDICINE

## 2024-08-18 PROCEDURE — 93010 ELECTROCARDIOGRAM REPORT: CPT

## 2024-08-18 PROCEDURE — 96375 TX/PRO/DX INJ NEW DRUG ADDON: CPT

## 2024-08-18 PROCEDURE — 96361 HYDRATE IV INFUSION ADD-ON: CPT

## 2024-08-18 PROCEDURE — 93005 ELECTROCARDIOGRAM TRACING: CPT

## 2024-08-18 PROCEDURE — 80053 COMPREHEN METABOLIC PANEL: CPT | Performed by: EMERGENCY MEDICINE

## 2024-08-18 RX ORDER — DIPHENHYDRAMINE HYDROCHLORIDE 50 MG/ML
50 INJECTION INTRAMUSCULAR; INTRAVENOUS ONCE
Status: COMPLETED | OUTPATIENT
Start: 2024-08-18 | End: 2024-08-18

## 2024-08-18 RX ORDER — KETOROLAC TROMETHAMINE 15 MG/ML
15 INJECTION, SOLUTION INTRAMUSCULAR; INTRAVENOUS ONCE
Status: COMPLETED | OUTPATIENT
Start: 2024-08-18 | End: 2024-08-18

## 2024-08-18 RX ORDER — DIPHENHYDRAMINE HYDROCHLORIDE 50 MG/ML
25 INJECTION INTRAMUSCULAR; INTRAVENOUS ONCE
Status: COMPLETED | OUTPATIENT
Start: 2024-08-18 | End: 2024-08-18

## 2024-08-18 RX ORDER — ONDANSETRON 2 MG/ML
4 INJECTION INTRAMUSCULAR; INTRAVENOUS ONCE
Status: COMPLETED | OUTPATIENT
Start: 2024-08-18 | End: 2024-08-18

## 2024-08-18 RX ORDER — ONDANSETRON 4 MG/1
4 TABLET, ORALLY DISINTEGRATING ORAL EVERY 8 HOURS PRN
Qty: 10 TABLET | Refills: 0 | Status: SHIPPED | OUTPATIENT
Start: 2024-08-18 | End: 2024-08-28

## 2024-08-18 RX ORDER — METOPROLOL TARTRATE 1 MG/ML
5 INJECTION, SOLUTION INTRAVENOUS ONCE
Status: COMPLETED | OUTPATIENT
Start: 2024-08-18 | End: 2024-08-18

## 2024-08-18 RX ORDER — SUMATRIPTAN 50 MG/1
50 TABLET, FILM COATED ORAL EVERY 2 HOUR PRN
Qty: 6 TABLET | Refills: 0 | Status: SHIPPED | OUTPATIENT
Start: 2024-08-18 | End: 2024-09-17

## 2024-08-18 RX ORDER — HALOPERIDOL 5 MG/ML
5 INJECTION INTRAMUSCULAR ONCE
Status: COMPLETED | OUTPATIENT
Start: 2024-08-18 | End: 2024-08-18

## 2024-08-18 RX ORDER — LORAZEPAM 1 MG/1
1 TABLET ORAL ONCE
Status: COMPLETED | OUTPATIENT
Start: 2024-08-18 | End: 2024-08-18

## 2024-08-18 RX ORDER — SUMATRIPTAN 6 MG/.5ML
6 INJECTION, SOLUTION SUBCUTANEOUS ONCE
Status: COMPLETED | OUTPATIENT
Start: 2024-08-18 | End: 2024-08-18

## 2024-08-18 RX ORDER — PROPRANOLOL HYDROCHLORIDE 10 MG/1
10 TABLET ORAL 3 TIMES DAILY PRN
Qty: 90 TABLET | Refills: 0 | Status: SHIPPED | OUTPATIENT
Start: 2024-08-18

## 2024-08-18 NOTE — ED PROVIDER NOTES
Patient Seen in: [facility] Emergency Department      History     Chief Complaint   Patient presents with    Headache    Arrythmia/Palpitations     Stated Complaint: patient seen at ED yesterday with migraine and palpitations, states she was dis*    Subjective:   Patient is a 34-year-old female presents emergency room with multiple complaints she had seen at [facility] last night for the same presentation she reports that her headache is not improved.  She has an appointment to see neurology at the end of this month she states.  She was also given referrals yesterday for neurology.  She is very anxious she is hyperventilating in the room.  Her heart rate decreases from 140s when she arrives here down to 102 while in the room.  I placed her on nonrebreather mask.  Medications given yesterday included Toradol benzo Reglan and Benadryl.  I will use a combination of Benadryl Toradol and Imitrex metoprolol and Ativan.  Also give Toradol.  Patient's head CT yesterday was negative.  She states that her home chronic pain medications Norco were not helping.  Explained to the patient at length and to family that narcotics do not help with migraine headaches.  She has photophobia.  Lights were turned off in the room to help with her symptoms.  I also gave her a nonrebreather mask to help with her hyperventilation.    The history is provided by the patient and the spouse.           Objective:   Past Medical History:    Endometriosis    Epilepsy (HCC)    Pelvic congestion syndrome              Past Surgical History:   Procedure Laterality Date    Laparoscopic assisted  01/10/2014    varicose vein on ovaries, minimal endometriotic lesions cul de sac, normal ovaries/tubes    Other  01/10/2014    laproscopic endometrial                Social History     Socioeconomic History    Marital status: Single   Tobacco Use    Smoking status: Former     Current packs/day: 0.00     Types: Cigarettes     Quit date: 1/21/2020      Years since quittin.5     Passive exposure: Past    Smokeless tobacco: Never   Vaping Use    Vaping status: Some Days   Substance and Sexual Activity    Alcohol use: No     Alcohol/week: 0.0 standard drinks of alcohol    Drug use: No    Sexual activity: Yes     Partners: Male   Other Topics Concern    Caffeine Concern Yes    Exercise Yes     Social Determinants of Health      [facility] from [facility]    Social Connections    Received from [facility]    Housing Stability              Review of Systems   Constitutional:  Negative for fever.   Gastrointestinal:  Positive for nausea.   Neurological:  Positive for headaches.   Psychiatric/Behavioral:  The patient is nervous/anxious.        Positive for stated Chief Complaint: Headache and Arrythmia/Palpitations    Other systems are as noted in HPI.  Constitutional and vital signs reviewed.      All other systems reviewed and negative except as noted above.    Physical Exam     ED Triage Vitals   BP 24 0038 111/83   Pulse 24 0038 (!) 130   Resp 24 0038 (!) 30   Temp 24 0040 98.6 °F (37 °C)   Temp src 24 0040 Temporal   SpO2 24 0038 100 %   O2 Device 24 0038 None (Room air)       Current Vitals:   Vital Signs  BP: 123/88  Pulse: 76  Resp: 20  Temp: 98.6 °F (37 °C)  Temp src: Temporal  MAP (mmHg): 100    Oxygen Therapy  SpO2: 100 %  O2 Device: None (Room air)            Physical Exam  Vitals and nursing note reviewed.   Constitutional:       General: She is not in acute distress.     Appearance: She is not toxic-appearing.      Comments: Adult female very anxious hyperventilating with sunglasses on.   HENT:      Head: Normocephalic and atraumatic.      Mouth/Throat:      Mouth: Mucous membranes are moist.   Eyes:      Extraocular Movements: Extraocular movements intact.      Pupils: Pupils are equal, round, and reactive to light.   Cardiovascular:      Rate and Rhythm: Regular rhythm.  Tachycardia present.      Pulses: Normal pulses.      Heart sounds: Normal heart sounds.   Pulmonary:      Effort: Pulmonary effort is normal. No respiratory distress.      Breath sounds: Normal breath sounds. No wheezing.      Comments: Hyperventilating  Abdominal:      General: There is no distension.      Palpations: Abdomen is soft.      Tenderness: There is no abdominal tenderness.   Musculoskeletal:         General: No swelling or deformity. Normal range of motion.   Skin:     General: Skin is warm.      Capillary Refill: Capillary refill takes less than 2 seconds.      Coloration: Skin is not pale.   Neurological:      General: No focal deficit present.      Mental Status: She is alert and oriented to person, place, and time.   Psychiatric:      Comments: Very anxious, hyperventilating               ED Course     Labs Reviewed   COMP METABOLIC PANEL (14) - Abnormal; Notable for the following components:       Result Value    Glucose 108 (*)     ALT 9 (*)     Albumin 4.9 (*)     All other components within normal limits   SARS-COV-2/FLU A AND B/RSV BY PCR (GENEXPERT) - Normal    Narrative:     This test is intended for the qualitative detection and differentiation of SARS-CoV-2, influenza A, influenza B, and respiratory syncytial virus (RSV) viral RNA in nasopharyngeal or nares swabs from individuals suspected of respiratory viral infection consistent with COVID-19 by their healthcare provider. Signs and symptoms of respiratory viral infection due to SARS-CoV-2, influenza, and RSV can be similar.    Test performed using the Xpert Xpress SARS-CoV-2/FLU/RSV (real time RT-PCR)  assay on the GeneXpert instrument, [employer], [place] [address].   This test is being used under the Food and Drug Administration's Emergency Use Authorization.    The authorized Fact Sheet for Healthcare Providers for this assay is available upon request from the laboratory.   CBC WITH DIFFERENTIAL WITH PLATELET     EKG    Rate, intervals  and axes as noted on EKG Report.  Rate: 128  Rhythm: Sinus Rhythm  Reading: Sinus tachycardia no ST elevation LA interval of 112 QRS of 70 QTc of 581 with axes of 74/55 unchanged from previous from yesterday                 No CT imaging ordered as patient had CT done yesterday that was normal         MDM      Social -positive vaping tobacco, negative etoh, negative drugs, denies pregnancy  Family History-noncontributory  Past Medical History-endometriosis with pelvic congestion syndrome, epilepsy    Differential diagnosis before testing included migraines, stress reaction, anxiety, viral syndrome    Co-morbidities that add to the complexity of management include: Patient seen for similar presentation yesterday with similar presentation    Testing ordered during this visit included baseline labs    Radiographic images  None    External chart review showed review of care everywhere in [facility] system shows patient was seen in the emergency room yesterday for similar presentation with a negative head CT and basic lab workup, she is also currently taking oxcarbazepine for her seizure    History obtained by an independent source included from patient, family    Discussion of management with patient, family    Social determinants of health that affect care include patient reports she is scheduled to see a new neurologist in the month      Medications Provided: Toradol Benadryl Zofran Imitrex metoprolol Ativan    Course of Events during Emergency Room Visit include 34-year-old female presents emergency room for evaluation of headache and similar presentation yesterday negative workup including head CT she is improved with her heart rate after metoprolol and p.o. Ativan.  Also given nonrebreather she was hyperventilating.  Patient is not currently taking any medications for migraines.  Will recommend Imitrex.  Will recommend follow-up with neurology.  Patient would only also benefit from propranolol as this would help with  anxiety and lowered her heart rate and also helping with migraines.    Patient reports that she is feeling more calm but she still having headache.  Will try Haldol and Benadryl to see if that will help with her symptoms.  Will recommend follow-up with neurologist.    I rechecked the patient at 2:29 AM she is resting comfortably she appears far more comfortable and patient reports her headache is improving.  She states it still present but not fully resolved.  Long discussion with patient and family about plans for follow-up with neurology and will start on a course of propranolol and given prescription for Imitrex and Zofran.  If symptoms return or further concerns patient to return to the emergency room her swabs for COVID flu and RSV are negative.    Disposition:        Discharge  I have discussed with the patient the results of test, differential diagnosis, treatment plan, warning signs and symptoms which should prompt immediate return.  They expressed understanding of these instructions and agrees to the following plan provided.  They were given written discharge instructions and agrees to return for any concerns and voiced understanding and all questions were answered.                                      Medical Decision Making      Disposition and Plan     Clinical Impression:  1. Other migraine without status migrainosus, not intractable         Disposition:  Discharge  8/18/2024  2:29 am    Follow-up:  Abelardo Massey  [address]  [address]  [facility] [address] [phone]    Schedule an appointment as soon as possible for a visit      Juan Zarate MD  [facility]  [address] [id number] [phone]    Schedule an appointment as soon as possible for a visit            Medications Prescribed:  Current Discharge Medication List        START taking these medications    Details   SUMAtriptan 50 MG Oral Tab Take 1 tablet (50 mg total) by mouth every 2 (two) hours as needed for  Migraine. No more than 4 tablets/24 hours.  Qty: 6 tablet, Refills: 0      ondansetron 4 MG Oral Tablet Dispersible Take 1 tablet (4 mg total) by mouth every 8 (eight) hours as needed for Nausea (vomiting).  Qty: 10 tablet, Refills: 0      propranolol 10 MG Oral Tab Take 1 tablet (10 mg total) by mouth 3 (three) times daily as needed (Anxiety). Hold if heart rate less than 90  Qty: 90 tablet, Refills: 0

## 2024-08-18 NOTE — ED QUICK NOTES
Patient upright in bed, reports she still has headache and is anxious. MD aware. Medications ordered.

## 2024-08-18 NOTE — ED INITIAL ASSESSMENT (HPI)
Pt seen at ED yesterday with migraine and palpitations, d/c'd home. Patient reports symptoms worsened and reports continued body aches, palpitations, and severe migraine. HR 150s on arrival.

## 2024-08-19 ENCOUNTER — TELEPHONE (OUTPATIENT)
Dept: NEUROLOGY | Facility: CLINIC | Age: 34
End: 2024-08-19

## 2024-08-19 NOTE — TELEPHONE ENCOUNTER
SEIZURE CLINIC SCREENING    1.         Date of ED discharge:  8/16/2024    2.         Is patient currently admitted?  No              (if YES - Seizure Clinic Appointment not required.  Patient should follow usual non-urgent scheduling procedures to see neurology upon discharge from hospital.)    2.         Does the patient already have a non-[facility] neurologist (seen in past 3 years, check care everywhere and ask the patient)?  No  Name:                (if YES - Seizure Clinic Appointment not required.  Patient should be advised to contact their neurologist.)    3.         Does patient already see an [facility] neurologist (seen in past 3 years)?  No  Name:                (if YES - Seizure Clinic Appointment not required.  Route message on to patient's [facility] neurologist.)    4.         Is Seizure Clinic Appointment indicated?  No BUT NEEDS APPOINTMENT TO GET ESTABLISHED WITH NEUROLOGIST..    If YES - route encounter to [facility] to schedule patient for clinic appointment, new patient visit, NO LATER THAN 72 HOURS AFTER DISCHARGE.                If UNSURE - route encounter to clinic provider for recommendation.                If NO - indicate reason and close encounter: N/A

## 2024-09-06 ENCOUNTER — OFFICE VISIT (OUTPATIENT)
Dept: NEUROLOGY | Facility: CLINIC | Age: 34
End: 2024-09-06
Payer: MEDICAID

## 2024-09-06 ENCOUNTER — LAB ENCOUNTER (OUTPATIENT)
Dept: LAB | Facility: HOSPITAL | Age: 34
End: 2024-09-06
Attending: Other
Payer: MEDICAID

## 2024-09-06 VITALS
BODY MASS INDEX: 20 KG/M2 | HEART RATE: 99 BPM | RESPIRATION RATE: 16 BRPM | SYSTOLIC BLOOD PRESSURE: 118 MMHG | DIASTOLIC BLOOD PRESSURE: 72 MMHG | WEIGHT: 115 LBS

## 2024-09-06 DIAGNOSIS — R20.2 NUMBNESS AND TINGLING OF RIGHT ARM: ICD-10-CM

## 2024-09-06 DIAGNOSIS — Z79.899 ENCOUNTER FOR LONG-TERM (CURRENT) DRUG USE: ICD-10-CM

## 2024-09-06 DIAGNOSIS — R20.0 NUMBNESS AND TINGLING OF RIGHT ARM: ICD-10-CM

## 2024-09-06 DIAGNOSIS — R51.9 NONINTRACTABLE HEADACHE, UNSPECIFIED CHRONICITY PATTERN, UNSPECIFIED HEADACHE TYPE: ICD-10-CM

## 2024-09-06 DIAGNOSIS — R56.9 CONVULSIONS, UNSPECIFIED CONVULSION TYPE (HCC): Primary | ICD-10-CM

## 2024-09-06 PROCEDURE — 80183 DRUG SCRN QUANT OXCARBAZEPIN: CPT

## 2024-09-06 PROCEDURE — 36415 COLL VENOUS BLD VENIPUNCTURE: CPT

## 2024-09-06 PROCEDURE — 99205 OFFICE O/P NEW HI 60 MIN: CPT | Performed by: OTHER

## 2024-09-06 RX ORDER — ONDANSETRON 4 MG/1
4 TABLET, ORALLY DISINTEGRATING ORAL EVERY 8 HOURS PRN
Qty: 15 TABLET | Refills: 1 | Status: SHIPPED | OUTPATIENT
Start: 2024-09-06

## 2024-09-06 RX ORDER — OXCARBAZEPINE 300 MG/1
600 TABLET, FILM COATED ORAL 2 TIMES DAILY
Qty: 120 TABLET | Refills: 11 | Status: SHIPPED | OUTPATIENT
Start: 2024-09-06 | End: 2025-09-06

## 2024-09-06 RX ORDER — HYDROCODONE BITARTRATE AND ACETAMINOPHEN 5; 325 MG/1; MG/1
1 TABLET ORAL 3 TIMES DAILY PRN
COMMUNITY
Start: 2024-08-19

## 2024-09-06 RX ORDER — BUTALBITAL, ACETAMINOPHEN AND CAFFEINE 50; 325; 40 MG/1; MG/1; MG/1
1 TABLET ORAL EVERY 4 HOURS PRN
COMMUNITY

## 2024-09-06 RX ORDER — ONDANSETRON 4 MG/1
4 TABLET, ORALLY DISINTEGRATING ORAL EVERY 8 HOURS PRN
COMMUNITY
Start: 2020-11-06 | End: 2024-09-06

## 2024-09-06 NOTE — PATIENT INSTRUCTIONS
Instructions from Dr. Zarate:       See me in 3 months.  Blood work today.  Schedule 2 MRIs and EEG test.    Magnesium oxide, or Magnesium gluconate, or Magnesium glycinate, 400-500 mg a day should be taken every day, whether you have headache or not, for prevention purposes.  This needs to be taken for a minimum of 6 weeks to be effective.  You will know if it is working when the headaches are half as often, or half as severe.      Riboflavin (Vitamin B2) 100-200 mg a day, should be taken every day, whether you have headache or not, for prevention purposes.  This needs to be taken for a minimum of 6 weeks to be effective.  You will know if it is working when the headaches are half as often, or half as severe.       ~~~~~~~~~~~~~~~~~~~~~~~     Refill policies:    Allow 2-3 business days for refills; controlled substances may take longer.  Contact your pharmacy at least 5 days prior to running out of medication and have them send an electronic request or submit request through the “request refill” option in your [employer] account.  Refills are not addressed on weekends; covering physicians do not authorize routine medications on weekends.  No narcotics or controlled substances are refilled after noon on Fridays or by on call physicians.  By law, narcotics must be electronically prescribed.  A 30 day supply with no refills is the maximum allowed.  If your prescription is due for a refill, you may be due for a follow up appointment.  To best provide you care, patients receiving routine medications need to be seen at least once a year.  Patients receiving narcotic/controlled substance medications need to be seen at least once every 3 months.  In the event that your preferred pharmacy does not have the requested medication in stock (e.g. Backordered), it is your responsibility to find another pharmacy that has the requested medication available.  We will gladly send a new prescription to that pharmacy at your  request.    Scheduling Tests:    If your physician has ordered radiology tests such as MRI or CT scans, please contact Central Scheduling at [phone] right away to schedule the test.  Once scheduled, the [facility] Centralized Referral Team will work with your insurance carrier to obtain pre-certification or prior authorization.  Depending on your insurance carrier, approval may take 3-10 days.  It is highly recommended patients assure they have received an authorization before having a test performed.  If test is done without insurance authorization, patient may be responsible for the entire amount billed.      Precertification and Prior Authorizations:  If your physician has recommended that you have a procedure or additional testing performed the [facility] Centralized Referral Team will contact your insurance carrier to obtain pre-certification or prior authorization.    You are strongly encouraged to contact your insurance carrier to verify that your procedure/test has been approved and is a COVERED benefit.  Although the [facility] Centralized Referral Team does its due diligence, the insurance carrier gives the disclaimer that \"Although the procedure is authorized, this does not guarantee payment.\"    Ultimately the patient is responsible for payment.   Thank you for your understanding in this matter.  Paperwork Completion:  If you require FMLA or disability paperwork for your recovery, please make sure to either drop it off or have it faxed to our office at [phone]. Be sure the form has your name and date of birth on it.  The form will be faxed to our Forms Department and they will complete it for you.  There is a 25$ fee for all forms that need to be filled out.  Please be aware there is a 10-14 day turnaround time.  You will need to sign a release of information (SHANAE) form if your paperwork does not come with one.  You may call the Forms Department with any questions at [phone].  Their fax number is [phone].

## 2024-09-06 NOTE — PROGRESS NOTES
Neurology History & Physical     ASSESSMENT & PLAN:      ICD-10-CM    1. Convulsions, unspecified convulsion type (HCC)  R56.9 MRI BRAIN (CPT=70551)     EEG      2. Nonintractable headache, unspecified chronicity pattern, unspecified headache type  R51.9 MRI BRAIN (CPT=70551)      3. Numbness and tingling of right arm  R20.0 MRI BRAIN (CPT=70551)    R20.2 MRI SPINE CERVICAL (CPT=72141) [[id number]]      4. Encounter for long-term (current) drug use  Z79.899 Oxcarbazepine (Trileptal), Serum        Convulsions concerning for seizures vs nonepileptic events.  We discussed the possibility of a psychogenic cause of her events.  This poses threat to bodily function when they happen, and she is not working or driving due to them, though she has had > 100 events, including daily for the past ~6 weeks, without any serious lasting physical harm.  Worsening of chronic headaches as well, with right arm tingling and numbness.  I advised them to make home videos.  Ordered 1 hour EEG, but then consider aEEG 24 hrs to capture events.  MRI brain and C spine ordered.  Continue  mg BID for now, check level.    For her uncontrolled migraines, will refill zofran PRN, and start Magnesium + B2.    We discussed medication side effects. We discussed symptoms that would warrant urgent/emergent evaluation. Patient verbalized understanding and agreement.    Return in about 3 months (around 12/6/2024).       ~~~~~~~~~~~~~~~~~~~~~~~~~~~    CHIEF COMPLAINT / REASON FOR VISIT:    Chief Complaint   Patient presents with    Seizures     Patient states  daily seizures.     Neurologic Problem     Decrease in speech, memory and balance.      HISTORY OBTAINED FROM:  Patient and boyfriend Andrew  Chart review    HISTORY:  Cecily Garsia is a 34 year old female with migraines, epilepsy (per chart), bipolar disorder, chronic pelvic pain.  She presented to ER with headache, right shoulder and hand numbness + tingling on 8/16/24, and then again on 8/18  for headache + palpitations.  In first ER visit there was also a discussion of her whole body pain, apparently has tried GBP and Norco for this in the past.  After second ER visit, was given imitrex + propranolol.  Went to [facility] 8/26/24 for similar symptoms.    She apparently had ? seizure like activity in April-May 2022, was started on OXC, and had unremarkable MRI + EEG.  She describes sometimes a \"weird rush\" and \"extremely weak\", will forget what is happening.  Per Andrew she will \"lock up completely\" (stiffness of all limbs while remaining seated), and then shakes for 15-30 seconds.  She \"comes back pretty quickly\"; though at times she may take time to recall what happened.  She may motion afterward but sometimes cannot speak.  She reports sometimes there are hours before the event she doesn't remember, but most of the time loses memory for 20 minutes.    Currently she is having daily events for the past 6 weeks.  But in ER was evaluated for non-seizure like symptoms as above.  Headaches have also worsened in the same time period, associated with right > left arm numbness/tingling.  There is also dizziness and confusion throughout this time.  She reports none of the migraine treatments are helping.  She did not start propranolol due to HR in 40s at home.    Currently, has headaches 3-4 days a week, takes tylenol or norco (rarely for headache,more often for rib and pelvic pain, through PCP) for headaches.  Twice a week, headaches are disabling.  She has considerable p/p/n, as well as emesis twice a week.    Currently not working, not driving.  She gets panic attacks just thinking about driving.      For seizures - OXC is not helping, though no side effects.    For headaches - imitrex and fioricet did not work.    DATA REVIEWED:  As documented in the history    Aug 2024  Head CT unremarkable   (I independently analyzed and interpreted this, and I agree with the reading physician report(s).)  CBC, CK, hCG, D  dimer, troponin, COVID/Flu/RSV unremarkable   CMP K 3.3  ER physician notes x 2    June 2024  Ob/gyn note    Oct 2022  MRI T spine unremarkable     Aug 2022  MRI brain unremarkable  EEG unremarkable     May 2022  Neuro note ([facility]) - apparently had first time seizure 4/26/24, but could hear partially through the shaking of 15-30 seconds.  Had recently started CZP and Zyprexa.  Was having stress and poor sleep.  Pt was driving.  Was having ongoing events of shaking + confusion, started on OXC.    Oct 2017   Neuro note ([facility]) - started amitriptyline + PRN imitrex/aleve for post concussion syndrome    PHYSICAL EXAMINATION:  /72   Pulse 99   Resp 16   Wt 115 lb (52.2 kg)   LMP 07/31/2024 (Exact Date)   BMI 19.74 kg/m²     Gen: in NAD  MSE: AAOx3, nl language, nl attn/conc, nl fund of knowledge  CN: PERRL, VFF; EOMI, no nystagmus; nl facial mvmt bilaterally; nl hearing bilaterally; nl palate elevation bilaterally; nl voice; nl shoulder shrug b/I; nl tongue movement  Motor: 5/5 x4; no drift; normal tone; no abnormal movements  Sensory: nl vibration in LUE and BLE, reduced in RUE  Coord: nl FTN b/I  Reflex: 2+ BUE and BLE  Gait: normal    No Known Allergies    Current medications:   HYDROcodone-acetaminophen 5-325 MG Oral Tab Take 1 tablet by mouth 3 (three) times daily as needed for Pain.      butalbital-acetaminophen-caffeine -40 MG Oral Tab Take 1 tablet by mouth every 4 (four) hours as needed for Headaches.      OXcarbazepine 300 MG Oral Tab Take 2 tablets (600 mg total) by mouth 2 (two) times daily. 120 tablet 11    ondansetron 4 MG Oral Tablet Dispersible Take 1 tablet (4 mg total) by mouth every 8 (eight) hours as needed for Nausea. 15 tablet 1    escitalopram 20 MG Oral Tab Take 1 tablet (20 mg total) by mouth daily.      amphetamine-dextroamphetamine 10 MG Oral Tab TAKE 1 TABLET BY MOUTH TWICE DAILY FOR ATTENTION      Naloxone HCl (NARCAN) 4 MG/0.1ML Nasal Liquid 1 spray by Nasal  route as needed.         Past Medical History:    Endometriosis    Epilepsy (HCC)    Pelvic congestion syndrome       Past Surgical History:   Procedure Laterality Date    Laparoscopic assisted  01/10/2014    varicose vein on ovaries, minimal endometriotic lesions cul de sac, normal ovaries/tubes    Other  01/10/2014    laproscopic endometrial       Social History     Socioeconomic History    Marital status: Single   Tobacco Use    Smoking status: Former     Current packs/day: 0.00     Types: Cigarettes     Quit date: 2020     Years since quittin.6     Passive exposure: Past    Smokeless tobacco: Never   Vaping Use    Vaping status: Some Days    Substances: Nicotine   Substance and Sexual Activity    Alcohol use: No     Alcohol/week: 0.0 standard drinks of alcohol    Drug use: No    Sexual activity: Yes     Partners: Male   Other Topics Concern    Caffeine Concern Yes    Exercise Yes       Family History   Problem Relation Age of Onset    High Blood Pressure Father     Breast Cancer Paternal Grandmother     Diabetes Paternal Grandmother     High Blood Pressure Paternal Grandfather     Cancer Paternal Grandfather         gall bladder    Breast Cancer Other         paternal aunt   Father, pat cousin, pat uncle have seizures    Juan Zarate MD, FAES, FAAN  Board-Certified in Neurology, Epilepsy, and Clinical Neurophysiology  [facility] [place]

## 2024-09-09 LAB — OXCARBAZEPINE LVL: 13 UG/ML

## 2024-09-26 ENCOUNTER — NURSE ONLY (OUTPATIENT)
Dept: ELECTROPHYSIOLOGY | Facility: HOSPITAL | Age: 34
End: 2024-09-26
Attending: Other
Payer: MEDICAID

## 2024-09-26 DIAGNOSIS — R56.9 CONVULSIONS, UNSPECIFIED CONVULSION TYPE (HCC): ICD-10-CM

## 2024-09-26 PROCEDURE — 95813 EEG EXTND MNTR 61-119 MIN: CPT

## 2024-10-10 ENCOUNTER — HOSPITAL ENCOUNTER (EMERGENCY)
Age: 34
Discharge: HOME | End: 2024-10-11
Attending: EMERGENCY MEDICINE
Payer: MEDICAID

## 2024-10-10 ENCOUNTER — APPOINTMENT (OUTPATIENT)
Dept: CT IMAGING | Age: 34
End: 2024-10-10
Attending: EMERGENCY MEDICINE
Payer: MEDICAID

## 2024-10-10 DIAGNOSIS — R10.9 ABDOMINAL PAIN OF UNKNOWN ETIOLOGY: Primary | ICD-10-CM

## 2024-10-10 LAB
ALBUMIN SERPL-MCNC: 3.9 G/DL (ref 3.4–5)
ALBUMIN/GLOB SERPL: 1.1 {RATIO} (ref 1–2)
ALP LIVER SERPL-CCNC: 58 U/L
ALT SERPL-CCNC: 20 U/L
ANION GAP SERPL CALC-SCNC: 3 MMOL/L (ref 0–18)
AST SERPL-CCNC: 10 U/L (ref 15–37)
B-HCG UR QL: NEGATIVE
BASOPHILS # BLD AUTO: 0.04 X10(3) UL (ref 0–0.2)
BASOPHILS NFR BLD AUTO: 0.4 %
BILIRUB SERPL-MCNC: 0.2 MG/DL (ref 0.1–2)
BILIRUB UR QL STRIP.AUTO: NEGATIVE
BUN BLD-MCNC: 11 MG/DL (ref 9–23)
CALCIUM BLD-MCNC: 9.2 MG/DL (ref 8.5–10.1)
CHLORIDE SERPL-SCNC: 108 MMOL/L (ref 98–112)
CO2 SERPL-SCNC: 27 MMOL/L (ref 21–32)
COLOR UR AUTO: YELLOW
CREAT BLD-MCNC: 0.87 MG/DL
EGFRCR SERPLBLD CKD-EPI 2021: 91 ML/MIN/1.73M2 (ref 60–?)
EOSINOPHIL # BLD AUTO: 0.23 X10(3) UL (ref 0–0.7)
EOSINOPHIL NFR BLD AUTO: 2.1 %
ERYTHROCYTE [DISTWIDTH] IN BLOOD BY AUTOMATED COUNT: 12.4 %
GLOBULIN PLAS-MCNC: 3.6 G/DL (ref 2.8–4.4)
GLUCOSE BLD-MCNC: 112 MG/DL (ref 70–99)
GLUCOSE UR STRIP.AUTO-MCNC: NEGATIVE MG/DL
HCT VFR BLD AUTO: 37.8 %
HGB BLD-MCNC: 13.1 G/DL
IMM GRANULOCYTES # BLD AUTO: 0.04 X10(3) UL (ref 0–1)
IMM GRANULOCYTES NFR BLD: 0.4 %
KETONES UR STRIP.AUTO-MCNC: NEGATIVE MG/DL
LIPASE SERPL-CCNC: 40 U/L (ref 12–53)
LYMPHOCYTES # BLD AUTO: 2.93 X10(3) UL (ref 1–4)
LYMPHOCYTES NFR BLD AUTO: 26.2 %
MCH RBC QN AUTO: 33.5 PG (ref 26–34)
MCHC RBC AUTO-ENTMCNC: 34.7 G/DL (ref 31–37)
MCV RBC AUTO: 96.7 FL
MONOCYTES # BLD AUTO: 0.67 X10(3) UL (ref 0.1–1)
MONOCYTES NFR BLD AUTO: 6 %
NEUTROPHILS # BLD AUTO: 7.26 X10 (3) UL (ref 1.5–7.7)
NEUTROPHILS # BLD AUTO: 7.26 X10(3) UL (ref 1.5–7.7)
NEUTROPHILS NFR BLD AUTO: 64.9 %
NITRITE UR QL STRIP.AUTO: NEGATIVE
OSMOLALITY SERPL CALC.SUM OF ELEC: 286 MOSM/KG (ref 275–295)
PH UR STRIP.AUTO: 7 [PH] (ref 5–8)
PLATELET # BLD AUTO: 263 10(3)UL (ref 150–450)
POTASSIUM SERPL-SCNC: 3.7 MMOL/L (ref 3.5–5.1)
PROT SERPL-MCNC: 7.5 G/DL (ref 6.4–8.2)
PROT UR STRIP.AUTO-MCNC: NEGATIVE MG/DL
RBC # BLD AUTO: 3.91 X10(6)UL
RBC UR QL AUTO: NEGATIVE
SODIUM SERPL-SCNC: 138 MMOL/L (ref 136–145)
SP GR UR STRIP.AUTO: 1.02 (ref 1–1.03)
UROBILINOGEN UR STRIP.AUTO-MCNC: 0.2 MG/DL
WBC # BLD AUTO: 11.2 X10(3) UL (ref 4–11)

## 2024-10-10 PROCEDURE — 74177 CT ABD & PELVIS W/CONTRAST: CPT | Performed by: EMERGENCY MEDICINE

## 2024-10-10 PROCEDURE — 96361 HYDRATE IV INFUSION ADD-ON: CPT

## 2024-10-10 PROCEDURE — 83690 ASSAY OF LIPASE: CPT | Performed by: EMERGENCY MEDICINE

## 2024-10-10 PROCEDURE — 99285 EMERGENCY DEPT VISIT HI MDM: CPT

## 2024-10-10 PROCEDURE — 87086 URINE CULTURE/COLONY COUNT: CPT | Performed by: EMERGENCY MEDICINE

## 2024-10-10 PROCEDURE — 96375 TX/PRO/DX INJ NEW DRUG ADDON: CPT

## 2024-10-10 PROCEDURE — 81015 MICROSCOPIC EXAM OF URINE: CPT | Performed by: EMERGENCY MEDICINE

## 2024-10-10 PROCEDURE — 85025 COMPLETE CBC W/AUTO DIFF WBC: CPT | Performed by: EMERGENCY MEDICINE

## 2024-10-10 PROCEDURE — 81025 URINE PREGNANCY TEST: CPT

## 2024-10-10 PROCEDURE — 80053 COMPREHEN METABOLIC PANEL: CPT | Performed by: EMERGENCY MEDICINE

## 2024-10-10 PROCEDURE — 81001 URINALYSIS AUTO W/SCOPE: CPT | Performed by: EMERGENCY MEDICINE

## 2024-10-10 RX ORDER — KETOROLAC TROMETHAMINE 15 MG/ML
15 INJECTION, SOLUTION INTRAMUSCULAR; INTRAVENOUS ONCE
Status: COMPLETED | OUTPATIENT
Start: 2024-10-10 | End: 2024-10-10

## 2024-10-10 RX ORDER — MORPHINE SULFATE 4 MG/ML
2 INJECTION, SOLUTION INTRAMUSCULAR; INTRAVENOUS ONCE
Status: COMPLETED | OUTPATIENT
Start: 2024-10-10 | End: 2024-10-10

## 2024-10-11 ENCOUNTER — APPOINTMENT (OUTPATIENT)
Dept: ULTRASOUND IMAGING | Age: 34
End: 2024-10-11
Attending: EMERGENCY MEDICINE
Payer: MEDICAID

## 2024-10-11 VITALS
BODY MASS INDEX: 20.14 KG/M2 | WEIGHT: 118 LBS | DIASTOLIC BLOOD PRESSURE: 74 MMHG | RESPIRATION RATE: 16 BRPM | SYSTOLIC BLOOD PRESSURE: 126 MMHG | HEART RATE: 86 BPM | TEMPERATURE: 98 F | HEIGHT: 64 IN | OXYGEN SATURATION: 100 %

## 2024-10-11 PROCEDURE — 76856 US EXAM PELVIC COMPLETE: CPT | Performed by: EMERGENCY MEDICINE

## 2024-10-11 PROCEDURE — 93975 VASCULAR STUDY: CPT | Performed by: EMERGENCY MEDICINE

## 2024-10-11 PROCEDURE — 96376 TX/PRO/DX INJ SAME DRUG ADON: CPT

## 2024-10-11 PROCEDURE — 96365 THER/PROPH/DIAG IV INF INIT: CPT

## 2024-10-11 RX ORDER — MORPHINE SULFATE 4 MG/ML
4 INJECTION, SOLUTION INTRAMUSCULAR; INTRAVENOUS ONCE
Status: COMPLETED | OUTPATIENT
Start: 2024-10-11 | End: 2024-10-11

## 2024-10-11 RX ORDER — CEPHALEXIN 500 MG/1
500 CAPSULE ORAL 3 TIMES DAILY
Qty: 21 CAPSULE | Refills: 0 | Status: SHIPPED | OUTPATIENT
Start: 2024-10-11 | End: 2024-10-18

## 2024-10-11 RX ORDER — CEFTRIAXONE 1 G/1
INJECTION, POWDER, FOR SOLUTION INTRAMUSCULAR; INTRAVENOUS
Status: COMPLETED
Start: 2024-10-11 | End: 2024-10-11

## 2024-10-11 RX ORDER — MORPHINE SULFATE 4 MG/ML
2 INJECTION, SOLUTION INTRAMUSCULAR; INTRAVENOUS ONCE
Status: COMPLETED | OUTPATIENT
Start: 2024-10-11 | End: 2024-10-11

## 2024-10-11 RX ORDER — HYDROCODONE BITARTRATE AND ACETAMINOPHEN 5; 325 MG/1; MG/1
1-2 TABLET ORAL EVERY 6 HOURS PRN
Qty: 10 TABLET | Refills: 0 | Status: SHIPPED | OUTPATIENT
Start: 2024-10-11 | End: 2024-10-16

## 2024-10-11 NOTE — DISCHARGE INSTRUCTIONS
Follow-up with your primary care doctor in the next week to establish care.  Take the medication as prescribed.  Return for any concerning symptoms.  The Norco can make you drowsy, so be careful when taking it.

## 2024-10-11 NOTE — ED PROVIDER NOTES
Patient Seen in: [facility] In [place]      History     Chief Complaint   Patient presents with    Urinary Symptoms    Abdomen/Flank Pain     Stated Complaint: r sided radiating to left sided abd pain for days, nausea and diarrhea. pt stat*    Subjective:   HPI      34-year-old female with past medical history of endometriosis and kidney stones presents today for evaluation.  On Monday, patient began having a right sided flank pain.  The pain was constant.  She reported some dysuria however denied any fevers or vomiting.  The pain radiates to her right lower abdomen and left lower abdomen today.  She initially thought the pain was similar to previous kidney stones.    Objective:     Past Medical History:    Endometriosis    Epilepsy (HCC)    Kidney stones    Pelvic congestion syndrome              Past Surgical History:   Procedure Laterality Date    Laparoscopic assisted  01/10/2014    varicose vein on ovaries, minimal endometriotic lesions cul de sac, normal ovaries/tubes    Other  01/10/2014    laproscopic endometrial                Social History     Socioeconomic History    Marital status: Single   Tobacco Use    Smoking status: Former     Current packs/day: 0.00     Types: Cigarettes     Quit date: 2020     Years since quittin.7     Passive exposure: Past    Smokeless tobacco: Never   Vaping Use    Vaping status: Some Days    Substances: Nicotine   Substance and Sexual Activity    Alcohol use: No     Alcohol/week: 0.0 standard drinks of alcohol    Drug use: No    Sexual activity: Yes     Partners: Male   Other Topics Concern    Caffeine Concern Yes    Exercise Yes     Social Drivers of Health      Received from [facility]    Social Connections    Received from [facility]    Housing Stability                  Physical Exam     ED Triage Vitals   BP 10/10/24 2203 (!) 117/93   Pulse 10/10/24 2203 113   Resp 10/10/24 2203 18   Temp 10/10/24  2203 99.2 °F (37.3 °C)   Temp src 10/11/24 0121 Oral   SpO2 10/10/24 2203 99 %   O2 Device 10/10/24 2203 None (Room air)       Current Vitals:   Vital Signs  BP: 126/74  Pulse: 86  Resp: 16  Temp: 98.4 °F (36.9 °C)  Temp src: Oral    Oxygen Therapy  SpO2: 100 %  O2 Device: None (Room air)        Physical Exam  Vitals and nursing note reviewed.   Constitutional:       Appearance: Normal appearance.   HENT:      Head: Normocephalic.      Nose: Nose normal.      Mouth/Throat:      Mouth: Mucous membranes are moist.   Eyes:      Extraocular Movements: Extraocular movements intact.   Cardiovascular:      Rate and Rhythm: Normal rate.   Pulmonary:      Effort: Pulmonary effort is normal.   Abdominal:      General: Abdomen is flat.      Tenderness: There is no abdominal tenderness. There is no right CVA tenderness, left CVA tenderness, guarding or rebound.   Musculoskeletal:         General: Normal range of motion.   Skin:     General: Skin is warm.   Neurological:      General: No focal deficit present.      Mental Status: She is alert.   Psychiatric:         Mood and Affect: Mood normal.         ED Course     Labs Reviewed   URINALYSIS WITH CULTURE REFLEX - Abnormal; Notable for the following components:       Result Value    Clarity Urine Cloudy (*)     Leukocyte Esterase Urine Small (*)     All other components within normal limits   CBC WITH DIFFERENTIAL WITH PLATELET - Abnormal; Notable for the following components:    WBC 11.2 (*)     All other components within normal limits   COMP METABOLIC PANEL (14) - Abnormal; Notable for the following components:    Glucose 112 (*)     AST 10 (*)     All other components within normal limits   UA MICROSCOPIC ONLY, URINE - Abnormal; Notable for the following components:    WBC Urine 6-10 (*)     Bacteria Urine 3+ (*)     Squamous Epi. Cells Many (*)     All other components within normal limits   LIPASE - Normal   POCT PREGNANCY URINE - Normal   RAINBOW DRAW LAVENDER   RAINBOW  DRAW LIGHT GREEN   URINE CULTURE, ROUTINE            CT ABDOMEN+PELVIS(CONTRAST ONLY)(CPT=74177)    Result Date: 10/10/2024  PROCEDURE:  CT ABDOMEN+PELVIS (CONTRAST ONLY) (CPT=74177)  COMPARISON:  [facility], CT, CT ABDOMEN+PELVIS KIDNEYSTONE 2D RNDR(NO IV,NO ORAL)(CPT=74176), 4/03/2022, 1:04 AM.  INDICATIONS:  r sided radiating to left sided abd pain for days, nausea and diarrhea. pt states shes barely urinated for 18hrs but has been drinking. hx of kidney stones.  TECHNIQUE:  CT scanning was performed from the dome of the diaphragm to the pubic symphysis with non-ionic intravenous contrast material. Post contrast coronal MPR imaging was performed.  Dose reduction techniques were used. Dose information is transmitted to the ACR (American College of Radiology) NRDR (National Radiology Data Registry) which includes the Dose Index Registry.  PATIENT STATED HISTORY:(As transcribed by Technologist)   r sided radiating to left sided abd pain for days, nausea and diarrhea. pt states shes barely urinated for 18hrs but has been drinking. hx of kidney stones.   CONTRAST USED:  65cc of Isovue 370  FINDINGS:  LIVER:  No enlargement, atrophy, abnormal density, or significant focal lesion.  BILIARY:  Gallbladder wall thickening is noted and is probably related to nondistention. PANCREAS:  No lesion, fluid collection, ductal dilatation, or atrophy.  SPLEEN:  No enlargement or focal lesion.  KIDNEYS:  No mass, obstruction, or calcification.  ADRENALS:  No mass or enlargement.  AORTA/VASCULAR:  No aneurysm or dissection.  RETROPERITONEUM:  No mass or adenopathy.  BOWEL/MESENTERY:  No visible mass, obstruction, or bowel wall thickening.  ABDOMINAL WALL:  No mass or hernia.  URINARY BLADDER:  No visible focal wall thickening, lesion, or calculus.  PELVIC NODES:  No adenopathy.  PELVIC ORGANS:  There is a small amount of free fluid in the pelvis which is most likely physiologic.  Corpus luteum cyst is noted in the left ovary. BONES:   No bony lesion or fracture.  LUNG BASES:  No visible pulmonary or pleural disease.  OTHER:  Negative.             CONCLUSION:  1. There is no specific evidence of an acute abnormality in the abdomen or pelvis. 2. Small amount of free fluid in pelvis and corpus luteum cyst left ovary are physiologic findings.   LOCATION:  [facility]   Dictated by (CST): Dao Daley MD on 10/10/2024 at 11:13 PM     Finalized by (CST): Dao Daley MD on 10/10/2024 at 11:15 PM          US PELVIS     Comparison: CT on 10/10/2024.     IMPRESSION:  Normal-sized ovaries. Spectral doppler flow present within both ovaries.     Unremarkable uterus.     Trace free fluid.       MDM      Differential Diagnosis  34-year-old female presents today for evaluation of right-sided flank pain that is radiating to the lower abdomen.  Her abdomen is soft without any tenderness, guarding or rigidity.  Differential would include cholelithiasis, cholecystitis, appendicitis, ureterolithiasis, pyelonephritis.  Plan for CT, labs along with UA.  Will give fluids, pain medicine and reassess.    12:26 am  Pain seems improved, although now she notes more discomfort in her left pelvis.  With the findings on CT, will obtain ultrasound to assess for signs of cyst or torsion    2:37 am  Patient's imaging does not demonstrate significant acute abnormality.  In all, her workup is reassuring.  Her urine demonstrated small leuk esterase and 6-10 WBCs.  There was 3+ bacteria however many squamous cells.  This may be a contaminant however with her presenting symptoms of flank pain, will empirically treat with antibiotics while awaiting culture results.  With her improvement, I feel patient is stable for discharge home.  I advised outpatient follow-up for reassessment return for any worsening symptoms.  She feels comfortable plan, will DC.    Patient was prescribed pain medicine for home.  She states she had been prescribed Norco routinely for chronic pain in the past which  she tolerated without issue.        Medical Decision Making      Disposition and Plan     Clinical Impression:  1. Abdominal pain of unknown etiology         Disposition:  Discharge  10/11/2024  2:41 am    Follow-up:  Kristina Medellin DO  [address] [address] [address] [phone]    Call in 1 day(s)            Medications Prescribed:  Discharge Medication List as of 10/11/2024  2:45 AM        START taking these medications    Details   !! HYDROcodone-acetaminophen 5-325 MG Oral Tab Take 1-2 tablets by mouth every 6 (six) hours as needed., Normal, Disp-10 tablet, R-0      cephALEXin 500 MG Oral Cap Take 1 capsule (500 mg total) by mouth 3 (three) times daily for 7 days., Normal, Disp-21 capsule, R-0       !! - Potential duplicate medications found. Please discuss with provider.              Supplementary Documentation:

## 2024-10-11 NOTE — ED QUICK NOTES
The heplock was dc'd with the tip intact. DC instr re abd pain were given.To take the meds as prescribed(drowsiness prec given). To f/u with the referral MD she was given. To return to the nearest ER if any problems develop. She expressed an understanding and was dc'd home, feeling better,compared to her arrival.

## 2024-10-11 NOTE — ED INITIAL ASSESSMENT (HPI)
r sided radiating to left sided abd pain for days, nausea and diarrhea. pt states shes barely urinated for 18hrs but has been drinking. hx of kidney stones.

## 2024-10-11 NOTE — ED QUICK NOTES
Report was received from Loretta JAVIER. The pt returned to the ER after her ultrasound was done and states her abdominal pain has increased to an \"8\" now. Dr Church was notified and no orders were received.

## 2024-10-18 ENCOUNTER — HOSPITAL ENCOUNTER (INPATIENT)
Facility: HOSPITAL | Age: 34
LOS: 4 days | Discharge: HOME | End: 2024-10-23
Attending: EMERGENCY MEDICINE | Admitting: HOSPITALIST
Payer: MEDICAID

## 2024-10-18 ENCOUNTER — APPOINTMENT (OUTPATIENT)
Dept: CT IMAGING | Age: 34
End: 2024-10-18
Attending: EMERGENCY MEDICINE
Payer: MEDICAID

## 2024-10-18 DIAGNOSIS — R51.9 ACUTE NONINTRACTABLE HEADACHE, UNSPECIFIED HEADACHE TYPE: ICD-10-CM

## 2024-10-18 DIAGNOSIS — R10.9 ABDOMINAL PAIN, ACUTE: Primary | ICD-10-CM

## 2024-10-18 LAB
ALBUMIN SERPL-MCNC: 3.8 G/DL (ref 3.4–5)
ALBUMIN/GLOB SERPL: 1.1 {RATIO} (ref 1–2)
ALP LIVER SERPL-CCNC: 53 U/L
ALT SERPL-CCNC: 22 U/L
ANION GAP SERPL CALC-SCNC: 4 MMOL/L (ref 0–18)
AST SERPL-CCNC: 11 U/L (ref 15–37)
B-HCG UR QL: NEGATIVE
BASOPHILS # BLD AUTO: 0.03 X10(3) UL (ref 0–0.2)
BASOPHILS NFR BLD AUTO: 0.4 %
BILIRUB SERPL-MCNC: 0.3 MG/DL (ref 0.1–2)
BILIRUB UR QL STRIP.AUTO: NEGATIVE
BUN BLD-MCNC: 7 MG/DL (ref 9–23)
CALCIUM BLD-MCNC: 8.8 MG/DL (ref 8.5–10.1)
CHLORIDE SERPL-SCNC: 105 MMOL/L (ref 98–112)
CLARITY UR REFRACT.AUTO: CLEAR
CO2 SERPL-SCNC: 28 MMOL/L (ref 21–32)
COLOR UR AUTO: YELLOW
CREAT BLD-MCNC: 0.75 MG/DL
EGFRCR SERPLBLD CKD-EPI 2021: 108 ML/MIN/1.73M2 (ref 60–?)
EOSINOPHIL # BLD AUTO: 0.28 X10(3) UL (ref 0–0.7)
EOSINOPHIL NFR BLD AUTO: 3.5 %
ERYTHROCYTE [DISTWIDTH] IN BLOOD BY AUTOMATED COUNT: 12.5 %
GLOBULIN PLAS-MCNC: 3.5 G/DL (ref 2.8–4.4)
GLUCOSE BLD-MCNC: 105 MG/DL (ref 70–99)
GLUCOSE UR STRIP.AUTO-MCNC: NEGATIVE MG/DL
HCT VFR BLD AUTO: 35.9 %
HGB BLD-MCNC: 12.5 G/DL
IMM GRANULOCYTES # BLD AUTO: 0.02 X10(3) UL (ref 0–1)
IMM GRANULOCYTES NFR BLD: 0.3 %
KETONES UR STRIP.AUTO-MCNC: NEGATIVE MG/DL
LEUKOCYTE ESTERASE UR QL STRIP.AUTO: NEGATIVE
LIPASE SERPL-CCNC: 21 U/L (ref 12–53)
LYMPHOCYTES # BLD AUTO: 2.8 X10(3) UL (ref 1–4)
LYMPHOCYTES NFR BLD AUTO: 35.2 %
MCH RBC QN AUTO: 34 PG (ref 26–34)
MCHC RBC AUTO-ENTMCNC: 34.8 G/DL (ref 31–37)
MCV RBC AUTO: 97.6 FL
MONOCYTES # BLD AUTO: 0.49 X10(3) UL (ref 0.1–1)
MONOCYTES NFR BLD AUTO: 6.2 %
NEUTROPHILS # BLD AUTO: 4.34 X10 (3) UL (ref 1.5–7.7)
NEUTROPHILS # BLD AUTO: 4.34 X10(3) UL (ref 1.5–7.7)
NEUTROPHILS NFR BLD AUTO: 54.4 %
NITRITE UR QL STRIP.AUTO: NEGATIVE
OSMOLALITY SERPL CALC.SUM OF ELEC: 282 MOSM/KG (ref 275–295)
PH UR STRIP.AUTO: 5.5 [PH] (ref 5–8)
PLATELET # BLD AUTO: 263 10(3)UL (ref 150–450)
POTASSIUM SERPL-SCNC: 3.9 MMOL/L (ref 3.5–5.1)
PROT SERPL-MCNC: 7.3 G/DL (ref 6.4–8.2)
PROT UR STRIP.AUTO-MCNC: NEGATIVE MG/DL
RBC # BLD AUTO: 3.68 X10(6)UL
RBC UR QL AUTO: NEGATIVE
SODIUM SERPL-SCNC: 137 MMOL/L (ref 136–145)
SP GR UR STRIP.AUTO: 1.01 (ref 1–1.03)
UROBILINOGEN UR STRIP.AUTO-MCNC: 0.2 MG/DL
WBC # BLD AUTO: 8 X10(3) UL (ref 4–11)

## 2024-10-18 PROCEDURE — 74177 CT ABD & PELVIS W/CONTRAST: CPT | Performed by: EMERGENCY MEDICINE

## 2024-10-18 PROCEDURE — 99223 1ST HOSP IP/OBS HIGH 75: CPT | Performed by: HOSPITALIST

## 2024-10-18 RX ORDER — HYDROMORPHONE HYDROCHLORIDE 1 MG/ML
1 INJECTION, SOLUTION INTRAMUSCULAR; INTRAVENOUS; SUBCUTANEOUS ONCE
Status: COMPLETED | OUTPATIENT
Start: 2024-10-18 | End: 2024-10-18

## 2024-10-18 RX ORDER — ONDANSETRON 2 MG/ML
4 INJECTION INTRAMUSCULAR; INTRAVENOUS ONCE
Status: COMPLETED | OUTPATIENT
Start: 2024-10-18 | End: 2024-10-18

## 2024-10-18 RX ORDER — HYDROMORPHONE HYDROCHLORIDE 1 MG/ML
0.5 INJECTION, SOLUTION INTRAMUSCULAR; INTRAVENOUS; SUBCUTANEOUS ONCE
Status: COMPLETED | OUTPATIENT
Start: 2024-10-18 | End: 2024-10-18

## 2024-10-18 RX ORDER — KETOROLAC TROMETHAMINE 15 MG/ML
15 INJECTION, SOLUTION INTRAMUSCULAR; INTRAVENOUS ONCE
Status: COMPLETED | OUTPATIENT
Start: 2024-10-18 | End: 2024-10-18

## 2024-10-18 RX ORDER — DICYCLOMINE HYDROCHLORIDE 10 MG/ML
20 INJECTION INTRAMUSCULAR ONCE
Status: COMPLETED | OUTPATIENT
Start: 2024-10-18 | End: 2024-10-18

## 2024-10-19 PROBLEM — F33.1 MODERATE EPISODE OF RECURRENT MAJOR DEPRESSIVE DISORDER (HCC): Chronic | Status: ACTIVE | Noted: 2024-10-19

## 2024-10-19 PROBLEM — R19.7 DIARRHEA OF PRESUMED INFECTIOUS ORIGIN: Status: ACTIVE | Noted: 2024-10-19

## 2024-10-19 LAB
ADENOVIRUS F 40/41 PCR: NEGATIVE
ANION GAP SERPL CALC-SCNC: 6 MMOL/L (ref 0–18)
ASTROVIRUS PCR: NEGATIVE
BASOPHILS # BLD AUTO: 0.04 X10(3) UL (ref 0–0.2)
BASOPHILS NFR BLD AUTO: 0.6 %
BUN BLD-MCNC: 5 MG/DL (ref 9–23)
C CAYETANENSIS DNA SPEC QL NAA+PROBE: NEGATIVE
C DIFF TOX B STL QL: NEGATIVE
CALCIUM BLD-MCNC: 8.9 MG/DL (ref 8.7–10.4)
CAMPY SP DNA.DIARRHEA STL QL NAA+PROBE: NEGATIVE
CHLORIDE SERPL-SCNC: 108 MMOL/L (ref 98–112)
CO2 SERPL-SCNC: 25 MMOL/L (ref 21–32)
CREAT BLD-MCNC: 0.64 MG/DL
CRYPTOSP DNA SPEC QL NAA+PROBE: NEGATIVE
EAEC PAA PLAS AGGR+AATA ST NAA+NON-PRB: NEGATIVE
EC STX1+STX2 + H7 FLIC SPEC NAA+PROBE: NEGATIVE
EGFRCR SERPLBLD CKD-EPI 2021: 120 ML/MIN/1.73M2 (ref 60–?)
ENTAMOEBA HISTOLYTICA PCR: NEGATIVE
EOSINOPHIL # BLD AUTO: 0.27 X10(3) UL (ref 0–0.7)
EOSINOPHIL NFR BLD AUTO: 4.1 %
EPEC EAE GENE STL QL NAA+NON-PROBE: NEGATIVE
ERYTHROCYTE [DISTWIDTH] IN BLOOD BY AUTOMATED COUNT: 12.3 %
ETEC LTA+ST1A+ST1B TOX ST NAA+NON-PROBE: NEGATIVE
GIARDIA LAMBLIA PCR: NEGATIVE
GLUCOSE BLD-MCNC: 100 MG/DL (ref 70–99)
GLUCOSE BLD-MCNC: 97 MG/DL (ref 70–99)
HCT VFR BLD AUTO: 31.3 %
HGB BLD-MCNC: 10.9 G/DL
IMM GRANULOCYTES # BLD AUTO: 0.01 X10(3) UL (ref 0–1)
IMM GRANULOCYTES NFR BLD: 0.2 %
LYMPHOCYTES # BLD AUTO: 3.28 X10(3) UL (ref 1–4)
LYMPHOCYTES NFR BLD AUTO: 49.9 %
MCH RBC QN AUTO: 33.4 PG (ref 26–34)
MCHC RBC AUTO-ENTMCNC: 34.8 G/DL (ref 31–37)
MCV RBC AUTO: 96 FL
MONOCYTES # BLD AUTO: 0.54 X10(3) UL (ref 0.1–1)
MONOCYTES NFR BLD AUTO: 8.2 %
NEUTROPHILS # BLD AUTO: 2.43 X10 (3) UL (ref 1.5–7.7)
NEUTROPHILS # BLD AUTO: 2.43 X10(3) UL (ref 1.5–7.7)
NEUTROPHILS NFR BLD AUTO: 37 %
NOROVIRUS GI/GII PCR: NEGATIVE
OSMOLALITY SERPL CALC.SUM OF ELEC: 285 MOSM/KG (ref 275–295)
P SHIGELLOIDES DNA STL QL NAA+PROBE: NEGATIVE
PLATELET # BLD AUTO: 224 10(3)UL (ref 150–450)
POTASSIUM SERPL-SCNC: 4 MMOL/L (ref 3.5–5.1)
RBC # BLD AUTO: 3.26 X10(6)UL
ROTAVIRUS A PCR: NEGATIVE
SALMONELLA DNA SPEC QL NAA+PROBE: NEGATIVE
SAPOVIRUS PCR: NEGATIVE
SHIGELLA SP+EIEC IPAH ST NAA+NON-PROBE: NEGATIVE
SODIUM SERPL-SCNC: 139 MMOL/L (ref 136–145)
V CHOLERAE DNA SPEC QL NAA+PROBE: NEGATIVE
VIBRIO DNA SPEC NAA+PROBE: NEGATIVE
WBC # BLD AUTO: 6.6 X10(3) UL (ref 4–11)
YERSINIA DNA SPEC NAA+PROBE: NEGATIVE

## 2024-10-19 PROCEDURE — 99232 SBSQ HOSP IP/OBS MODERATE 35: CPT | Performed by: INTERNAL MEDICINE

## 2024-10-19 RX ORDER — LORAZEPAM 2 MG/ML
1 INJECTION INTRAMUSCULAR
Status: DISCONTINUED | OUTPATIENT
Start: 2024-10-20 | End: 2024-10-20

## 2024-10-19 RX ORDER — DIAZEPAM 2 MG/1
2 TABLET ORAL EVERY 12 HOURS PRN
Status: DISCONTINUED | OUTPATIENT
Start: 2024-10-19 | End: 2024-10-20

## 2024-10-19 RX ORDER — KETOROLAC TROMETHAMINE 30 MG/ML
30 INJECTION, SOLUTION INTRAMUSCULAR; INTRAVENOUS EVERY 6 HOURS PRN
Status: DISPENSED | OUTPATIENT
Start: 2024-10-19 | End: 2024-10-21

## 2024-10-19 RX ORDER — PROCHLORPERAZINE EDISYLATE 5 MG/ML
5 INJECTION INTRAMUSCULAR; INTRAVENOUS EVERY 8 HOURS PRN
Status: DISCONTINUED | OUTPATIENT
Start: 2024-10-19 | End: 2024-10-23

## 2024-10-19 RX ORDER — SODIUM CHLORIDE 9 MG/ML
INJECTION, SOLUTION INTRAVENOUS CONTINUOUS
Status: DISCONTINUED | OUTPATIENT
Start: 2024-10-19 | End: 2024-10-19

## 2024-10-19 RX ORDER — HYDROMORPHONE HYDROCHLORIDE 1 MG/ML
0.5 INJECTION, SOLUTION INTRAMUSCULAR; INTRAVENOUS; SUBCUTANEOUS EVERY 30 MIN PRN
Status: ACTIVE | OUTPATIENT
Start: 2024-10-19 | End: 2024-10-19

## 2024-10-19 RX ORDER — SODIUM PHOSPHATE, DIBASIC AND SODIUM PHOSPHATE, MONOBASIC 7; 19 G/230ML; G/230ML
1 ENEMA RECTAL ONCE AS NEEDED
Status: DISCONTINUED | OUTPATIENT
Start: 2024-10-19 | End: 2024-10-19

## 2024-10-19 RX ORDER — HYDROCODONE BITARTRATE AND ACETAMINOPHEN 10; 325 MG/1; MG/1
1 TABLET ORAL EVERY 4 HOURS PRN
Status: DISCONTINUED | OUTPATIENT
Start: 2024-10-19 | End: 2024-10-23

## 2024-10-19 RX ORDER — HYDROMORPHONE HYDROCHLORIDE 1 MG/ML
0.2 INJECTION, SOLUTION INTRAMUSCULAR; INTRAVENOUS; SUBCUTANEOUS EVERY 2 HOUR PRN
Status: DISCONTINUED | OUTPATIENT
Start: 2024-10-19 | End: 2024-10-23

## 2024-10-19 RX ORDER — SODIUM CHLORIDE 9 MG/ML
INJECTION, SOLUTION INTRAVENOUS CONTINUOUS
Status: DISCONTINUED | OUTPATIENT
Start: 2024-10-19 | End: 2024-10-23

## 2024-10-19 RX ORDER — ENOXAPARIN SODIUM 100 MG/ML
40 INJECTION SUBCUTANEOUS DAILY
Status: DISCONTINUED | OUTPATIENT
Start: 2024-10-19 | End: 2024-10-23

## 2024-10-19 RX ORDER — ONDANSETRON 2 MG/ML
4 INJECTION INTRAMUSCULAR; INTRAVENOUS EVERY 4 HOURS PRN
Status: ACTIVE | OUTPATIENT
Start: 2024-10-19 | End: 2024-10-19

## 2024-10-19 RX ORDER — SENNOSIDES 8.6 MG
17.2 TABLET ORAL NIGHTLY PRN
Status: DISCONTINUED | OUTPATIENT
Start: 2024-10-19 | End: 2024-10-19

## 2024-10-19 RX ORDER — BISACODYL 10 MG
10 SUPPOSITORY, RECTAL RECTAL
Status: DISCONTINUED | OUTPATIENT
Start: 2024-10-19 | End: 2024-10-19

## 2024-10-19 RX ORDER — ACETAMINOPHEN 500 MG
500 TABLET ORAL EVERY 4 HOURS PRN
Status: DISCONTINUED | OUTPATIENT
Start: 2024-10-19 | End: 2024-10-23

## 2024-10-19 RX ORDER — KETOROLAC TROMETHAMINE 15 MG/ML
15 INJECTION, SOLUTION INTRAMUSCULAR; INTRAVENOUS EVERY 6 HOURS PRN
Status: ACTIVE | OUTPATIENT
Start: 2024-10-19 | End: 2024-10-21

## 2024-10-19 RX ORDER — BUTALBITAL, ACETAMINOPHEN AND CAFFEINE 50; 325; 40 MG/1; MG/1; MG/1
1 TABLET ORAL EVERY 4 HOURS PRN
Status: DISCONTINUED | OUTPATIENT
Start: 2024-10-19 | End: 2024-10-23

## 2024-10-19 RX ORDER — HYDROMORPHONE HYDROCHLORIDE 1 MG/ML
0.4 INJECTION, SOLUTION INTRAMUSCULAR; INTRAVENOUS; SUBCUTANEOUS EVERY 2 HOUR PRN
Status: DISCONTINUED | OUTPATIENT
Start: 2024-10-19 | End: 2024-10-23

## 2024-10-19 RX ORDER — MELATONIN
3 NIGHTLY PRN
Status: DISCONTINUED | OUTPATIENT
Start: 2024-10-19 | End: 2024-10-23

## 2024-10-19 RX ORDER — OXCARBAZEPINE 300 MG/1
600 TABLET, FILM COATED ORAL 2 TIMES DAILY
Status: DISCONTINUED | OUTPATIENT
Start: 2024-10-19 | End: 2024-10-23

## 2024-10-19 RX ORDER — HYDROMORPHONE HYDROCHLORIDE 1 MG/ML
0.8 INJECTION, SOLUTION INTRAMUSCULAR; INTRAVENOUS; SUBCUTANEOUS EVERY 2 HOUR PRN
Status: DISCONTINUED | OUTPATIENT
Start: 2024-10-19 | End: 2024-10-23

## 2024-10-19 RX ORDER — ONDANSETRON 2 MG/ML
4 INJECTION INTRAMUSCULAR; INTRAVENOUS EVERY 6 HOURS PRN
Status: DISCONTINUED | OUTPATIENT
Start: 2024-10-19 | End: 2024-10-23

## 2024-10-19 RX ORDER — ESCITALOPRAM OXALATE 20 MG/1
20 TABLET ORAL DAILY
Status: DISCONTINUED | OUTPATIENT
Start: 2024-10-19 | End: 2024-10-23

## 2024-10-19 RX ORDER — POLYETHYLENE GLYCOL 3350 17 G/17G
17 POWDER, FOR SOLUTION ORAL DAILY PRN
Status: DISCONTINUED | OUTPATIENT
Start: 2024-10-19 | End: 2024-10-23

## 2024-10-19 NOTE — PLAN OF CARE
Pt a&o x 4.  Pleasant & cooperative w/ care  Up to bathroom w/ slow steady gait.  Pt has good safety awareness  Seizure precautions continued  Stool for GI panel sent to lab  Negative CDIff.  Isolation discontinued  Toradol & tylenol given for c/o pain  Voiding w/out difficulty

## 2024-10-19 NOTE — ED INITIAL ASSESSMENT (HPI)
Abdominal bloating/distension. Here 10/10 and dx'd with cyst on ovary and free fluid in culdesac. Rx'd abx. Now having diarrhea. States 14 loose stools today. Feeling weak and occasional dizziness.

## 2024-10-19 NOTE — ED QUICK NOTES
Orders for admission, patient is aware of plan and ready to go upstairs. Any questions, please call ED RN Alka at extension [phone].     Patient Covid vaccination status: Unvaccinated     COVID Test Ordered in ED: None    COVID Suspicion at Admission: N/A    Running Infusions:  None    Mental Status/LOC at time of transport: A+O x4    Other pertinent information:   CIWA score: N/A   NIH score:  N/A

## 2024-10-19 NOTE — PROGRESS NOTES
Pt reports eating 2 pieces Romansh toast, hash browns, yogurt,  2 OJ for breakfast.  Currently sitting up in bed eating donuts.    Stool specimen negative  Currently reporting pain '8/10'

## 2024-10-19 NOTE — H&P
[facility]  History and Physical     Cecily Garsia Patient Status:  Emergency    1990 MRN [id number]   Location [facility] IN [place] Attending Cristopher Rivera MD   Hosp Day # 0 PCP None Pcp     Chief Complaint: Abdominal pain    Subjective:    History of Present Illness:     Cecily Garsia is a 34 year old female with history of epilepsy, endometriosis presents emergency room with abdominal pain has been going on for the last 10 days.  Pain is located in the right flank and lower quadrant.  Patient was seen in the ER last week had a urine that was questionable and was treated with Keflex.  Patient states that since then she has been having some diarrhea about 25 episodes yesterday and about 15 today.  Patient reports small amount of red blood in the diarrhea.  Patient had some nausea and vomiting.  No fevers, chills    History/Other:    Past Medical History:  Past Medical History:    Endometriosis    Epilepsy (HCC)    Kidney stones    Pelvic congestion syndrome     Past Surgical History:   Past Surgical History:   Procedure Laterality Date    Laparoscopic assisted  01/10/2014    varicose vein on ovaries, minimal endometriotic lesions cul de sac, normal ovaries/tubes    Other  01/10/2014    laproscopic endometrial      Family History:   Family History   Problem Relation Age of Onset    High Blood Pressure Father     Breast Cancer Paternal Grandmother     Diabetes Paternal Grandmother     High Blood Pressure Paternal Grandfather     Cancer Paternal Grandfather         gall bladder    Breast Cancer Other         paternal aunt     Social History:    reports that she quit smoking about 4 years ago. Her smoking use included cigarettes. She has been exposed to tobacco smoke. She has never used smokeless tobacco. She reports that she does not drink alcohol and does not use drugs.     Allergies: Allergies[1]    Medications:  Medications Ordered Prior to  Encounter[2]    Review of Systems:   A comprehensive review of systems was completed.    Pertinent positives and negatives noted in the HPI.    Objective:   Physical Exam:    /74   Pulse 89   Temp 98.9 °F (37.2 °C) (Oral)   Resp 16   Ht 5' 4\" (1.626 m)   Wt 121 lb (54.9 kg)   LMP 09/27/2024 (Exact Date)   SpO2 100%   BMI 20.77 kg/m²   General: No acute distress, Alert  Respiratory: No rhonchi, no wheezes  Cardiovascular: S1, S2. Regular rate and rhythm  Abdomen: Soft, Non-tender, non-distended, positive bowel sounds  Neuro: No new focal deficits  Extremities: No edema      Results:    Labs:      Labs Last 24 Hours:    Recent Labs   Lab 10/18/24  2027   RBC 3.68*   HGB 12.5   HCT 35.9   MCV 97.6   MCH 34.0   MCHC 34.8   RDW 12.5   NEPRELIM 4.34   WBC 8.0   .0       Recent Labs   Lab 10/18/24  2027   *   BUN 7*   CREATSERUM 0.75   EGFRCR 108   CA 8.8   ALB 3.8      K 3.9      CO2 28.0   ALKPHO 53   AST 11*   ALT 22   BILT 0.3   TP 7.3       No results found for: \"PT\", \"INR\"    No results for input(s): \"TROP\", \"TROPHS\", \"CK\" in the last 168 hours.    No results for input(s): \"TROP\", \"PBNP\" in the last 168 hours.    No results for input(s): \"PCT\" in the last 168 hours.    Imaging: Imaging data reviewed in Epic.    Assessment & Plan:      # Diarrhea  - recent abx for UTI  - Will out C. Diff    # Abdominal pain  - Will continue on IV toradol  - Imaging negative    # Hx Epilepsy  - Will continue on oxcarbazipine    # Depression  - will continue on SSRI    # Hx migraines  - Will continue on PPx propranolol  -Continue on something Fioricet as needed.        Plan of care discussed with patient at bedside.    Cl Rodriguez, DO    Supplementary Documentation:     The 21st Century Cures Act makes medical notes like these available to patients in the interest of transparency. Please be advised this is a medical document. Medical documents are intended to carry relevant  information, facts as evident, and the clinical opinion of the practitioner. The medical note is intended as peer to peer communication and may appear blunt or direct. It is written in medical language and may contain abbreviations or verbiage that are unfamiliar.                                     [1] No Known Allergies  [2]   Current Facility-Administered Medications on File Prior to Encounter   Medication Dose Route Frequency Provider Last Rate Last Admin    [COMPLETED] morphINE PF 4 MG/ML injection 2 mg  2 mg Intravenous Once Arturo Church MD   2 mg at 10/11/24 0030    [COMPLETED] cefTRIAXone (Rocephin) 2 g in sodium chloride 0.9% 100 mL IVPB-ADDV  2 g Intravenous Once Arturo Church MD   Stopped at 10/11/24 0203    [COMPLETED] cefTRIAXone (Rocephin) 1 g injection             [COMPLETED] morphINE PF 4 MG/ML injection 4 mg  4 mg Intravenous Once Arturo Church MD   4 mg at 10/11/24 0158    [COMPLETED] ketorolac (Toradol) 15 MG/ML injection 15 mg  15 mg Intravenous Once Arturo Church MD   15 mg at 10/10/24 2229    [COMPLETED] sodium chloride 0.9 % IV bolus 1,000 mL  1,000 mL Intravenous Once Arturo Church MD   Stopped at 10/11/24 0110    [COMPLETED] iopamidol 76% (ISOVUE-370) injection for power injector  65 mL Intravenous ONCE PRN Arturo Church MD   65 mL at 10/10/24 2258    [COMPLETED] morphINE PF 4 MG/ML injection 2 mg  2 mg Intravenous Once Arturo Church MD   2 mg at 10/10/24 2330    [COMPLETED] diphenhydrAMINE (Benadryl) 50 mg/mL  injection 50 mg  50 mg Intravenous Once Melissa Kaplan DO   50 mg at 08/18/24 0051    [COMPLETED] ketorolac (Toradol) 15 MG/ML injection 15 mg  15 mg Intravenous Once Melissa Kaplan DO   15 mg at 08/18/24 0051    [COMPLETED] sodium chloride 0.9 % IV bolus 1,000 mL  1,000 mL Intravenous Once Melissa Kaplan DO   Stopped at 08/18/24 0200    [COMPLETED] ondansetron (Zofran) 4 MG/2ML injection 4 mg  4  mg Intravenous Once Rosaura, Melissa, DO   4 mg at 24 0051    [COMPLETED] LORazepam (Ativan) tab 1 mg  1 mg Oral Once RosauraStevenah, DO   1 mg at 24 0052    [COMPLETED] metoprolol (Lopressor) 5 mg/5mL injection 5 mg  5 mg Intravenous Once Rosaura, Melissa, DO   5 mg at 24 0105    [COMPLETED] SUMAtriptan (Imitrex) 6 MG/0.5ML SUBQ injection 6 mg  6 mg Subcutaneous Once RosauraStevenah, DO   6 mg at 24 0104    [COMPLETED] LORazepam (Ativan) tab 1 mg  1 mg Oral Once RosauraStevenah, DO   1 mg at 24 0117    [COMPLETED] haloperidol lactate (Haldol) 5 MG/ML injection 5 mg  5 mg Intramuscular Once RosauraMelissa, DO   5 mg at 24 0159    [COMPLETED] diphenhydrAMINE (Benadryl) 50 mg/mL  injection 25 mg  25 mg Intravenous Once RosauraMelissa, DO   25 mg at 24 0200    [COMPLETED] sodium chloride 0.9 % IV bolus 1,000 mL  1,000 mL Intravenous Once Arturo Church MD   Stopped at 24    [COMPLETED] metoclopramide (Reglan) 5 mg/mL injection 5 mg  5 mg Intravenous Once Arturo Church MD   5 mg at 24    [COMPLETED] diphenhydrAMINE (Benadryl) 50 mg/mL  injection 12.5 mg  12.5 mg Intravenous Once Arturo Church MD   12.5 mg at 24    [COMPLETED] LORazepam (Ativan) 2 mg/mL injection 1 mg  1 mg Intravenous Once Arturo Church MD   1 mg at 24    [COMPLETED] LORazepam (Ativan) 2 mg/mL injection 0.5 mg  0.5 mg Intravenous Once Arturo Cuhrch MD   0.5 mg at 24    [COMPLETED] ketorolac (Toradol) 15 MG/ML injection 15 mg  15 mg Intravenous Once Arturo Church MD   15 mg at 24     Current Outpatient Medications on File Prior to Encounter   Medication Sig Dispense Refill    [] HYDROcodone-acetaminophen 5-325 MG Oral Tab Take 1-2 tablets by mouth every 6 (six) hours as needed. 10 tablet 0    cephALEXin 500 MG Oral Cap Take 1 capsule (500 mg total) by mouth 3 (three) times daily for 7  days. 21 capsule 0    HYDROcodone-acetaminophen 5-325 MG Oral Tab Take 1 tablet by mouth 3 (three) times daily as needed for Pain. (Patient not taking: Reported on 10/10/2024)      butalbital-acetaminophen-caffeine -40 MG Oral Tab Take 1 tablet by mouth every 4 (four) hours as needed for Headaches.      OXcarbazepine 300 MG Oral Tab Take 2 tablets (600 mg total) by mouth 2 (two) times daily. 120 tablet 11    ondansetron 4 MG Oral Tablet Dispersible Take 1 tablet (4 mg total) by mouth every 8 (eight) hours as needed for Nausea. 15 tablet 1    [] ondansetron 4 MG Oral Tablet Dispersible Take 1 tablet (4 mg total) by mouth every 8 (eight) hours as needed for Nausea (vomiting). 10 tablet 0    propranolol 10 MG Oral Tab Take 1 tablet (10 mg total) by mouth 3 (three) times daily as needed (Anxiety). Hold if heart rate less than 90 (Patient not taking: Reported on 2024) 90 tablet 0    [] diazePAM 2 MG Oral Tab Take 1 tablet (2 mg total) by mouth every 12 (twelve) hours as needed for Anxiety (spasm). 6 tablet 0    escitalopram 20 MG Oral Tab Take 1 tablet (20 mg total) by mouth daily.      amphetamine-dextroamphetamine 10 MG Oral Tab TAKE 1 TABLET BY MOUTH TWICE DAILY FOR ATTENTION (Patient not taking: Reported on 10/10/2024)      Naloxone HCl (NARCAN) 4 MG/0.1ML Nasal Liquid 1 spray by Nasal route as needed.

## 2024-10-19 NOTE — ED PROVIDER NOTES
Patient Seen in: [facility] In [place]      History     Chief Complaint   Patient presents with    Nausea/Vomiting/Diarrhea     Stated Complaint: diarrhea, abd distension    Subjective:   HPI      34-year-old female complaint of abdominal pain the patient describes that she had abdominal pain that started in the right flank right lower quadrant about 10 days ago she was seen approximately week ago in the emergency department had a CT scan which showed no specific etiology for abdominal pain her urine had questionable infection so she was treated with Keflex cultures were unremarkable.  The patient states that the pain improved for some for few days somewhat did not go away completely but then has worsened the last few days she has abdominal distention then she had 25 episodes of diarrhea yesterday and about 15 today there was some small amounts of blood in the diarrhea.  She has some nausea and vomited once she does have a history of kidney stones pelvic congestion syndrome endometriosis no reported fever    Objective:     Past Medical History:    Endometriosis    Epilepsy (HCC)    Kidney stones    Pelvic congestion syndrome              Past Surgical History:   Procedure Laterality Date    Laparoscopic assisted  01/10/2014    varicose vein on ovaries, minimal endometriotic lesions cul de sac, normal ovaries/tubes    Other  01/10/2014    laproscopic endometrial                Social History     Socioeconomic History    Marital status: Single   Tobacco Use    Smoking status: Former     Current packs/day: 0.00     Types: Cigarettes     Quit date: 2020     Years since quittin.7     Passive exposure: Past    Smokeless tobacco: Never   Vaping Use    Vaping status: Some Days    Substances: Nicotine   Substance and Sexual Activity    Alcohol use: No     Alcohol/week: 0.0 standard drinks of alcohol    Drug use: No    Sexual activity: Yes     Partners: Male   Other Topics Concern    Caffeine  Concern Yes    Exercise Yes     Social Drivers of Health     Food Insecurity: No Food Insecurity (10/19/2024)    Food Insecurity     Food Insecurity: Never true   Transportation Needs: No Transportation Needs (10/19/2024)    Transportation Needs     Lack of Transportation: No    Received from [facility]    Social Connections   Housing Stability: Low Risk  (10/19/2024)    Housing Stability     Housing Instability: No                  Physical Exam     ED Triage Vitals [10/18/24 2009]   /80   Pulse 92   Resp 17   Temp 98.9 °F (37.2 °C)   Temp src Oral   SpO2 98 %   O2 Device None (Room air)       Current Vitals:   Vital Signs  BP: 99/68  Pulse: 61  Resp: 16  Temp: 98.7 °F (37.1 °C)  Temp src: Oral  MAP (mmHg): 66    Oxygen Therapy  SpO2: 98 %  O2 Device: None (Room air)  Pulse Oximetry Type: Intermittent  Pulse Ox Probe Location: Right hand        Physical Exam  Patient is alert orient x 3 in no acute distress dress HEENT exam within normal limits neck there is no lymphadenopathy or JVD lungs are clear abdomen is moderately distended across the lower abdomen there is mild to moderate upper abdominal tenderness moderate tenderness across lower abdomen no masses palpable.  Bowel sounds normal active.  Extremities normal skin is no rash.    ED Course     Labs Reviewed   COMP METABOLIC PANEL (14) - Abnormal; Notable for the following components:       Result Value    Glucose 105 (*)     BUN 7 (*)     AST 11 (*)     All other components within normal limits   CBC WITH DIFFERENTIAL WITH PLATELET - Abnormal; Notable for the following components:    RBC 3.68 (*)     All other components within normal limits   BASIC METABOLIC PANEL (8) - Abnormal; Notable for the following components:    BUN 5 (*)     All other components within normal limits   CBC WITH DIFFERENTIAL WITH PLATELET - Abnormal; Notable for the following components:    RBC 3.26 (*)     HGB 10.9 (*)     HCT 31.3 (*)     All other  components within normal limits   LIPASE - Normal   POCT PREGNANCY URINE - Normal   C. DIFFICILE(TOXIGENIC)PCR - Normal   URINALYSIS WITH CULTURE REFLEX   GI STOOL PANEL BY PCR    Narrative:     The GI Panel tests for Campylobacter species jejuni, coli, and   upsaliensis; all species, subspecies, and serovars of Salmonella;   and Vibrio species parahaemolyticus, vulnificus, and cholera.   It does NOT test for Aeromonas or Edwardsiella species.            Abnormal Labs Reviewed   COMP METABOLIC PANEL (14) - Abnormal; Notable for the following components:       Result Value    Glucose 105 (*)     BUN 7 (*)     AST 11 (*)     All other components within normal limits   CBC WITH DIFFERENTIAL WITH PLATELET - Abnormal; Notable for the following components:    RBC 3.68 (*)     All other components within normal limits   BASIC METABOLIC PANEL (8) - Abnormal; Notable for the following components:    BUN 5 (*)     All other components within normal limits   CBC WITH DIFFERENTIAL WITH PLATELET - Abnormal; Notable for the following components:    RBC 3.26 (*)     HGB 10.9 (*)     HCT 31.3 (*)     All other components within normal limits     Labs showed a hemoglobin 10.9  CT ABDOMEN+PELVIS(CONTRAST ONLY)(CPT=74177)    Result Date: 10/18/2024  CONCLUSION:  Mild bladder wall thickening likely secondary to incomplete distension but correlation with urinalysis is recommended.   LOCATION:  [facility]   Dictated by (CST): Berna Castillo MD on 10/18/2024 at 9:40 PM     Finalized by (CST): Berna Castillo MD on 10/18/2024 at 9:43 PM       Imaging independent reviewed there is mild bladder wall thickening otherwise unremarkable.       MDM      Initial differential diagnosis considered but not limited to includes C. difficile colitis gastroenteritis pending sinus diverticulitis cholelithiasis.        Medical Decision Making    Patient was given IV fluids and pain medication but she continued to have considerable pain was admitted for  further evaluation observation pain control and fluids  Disposition and Plan     Clinical Impression:  1. Abdominal pain, acute         Disposition:  There is no disposition on file for this visit.  There is no disposition time on file for this visit.    Follow-up:  No follow-up provider specified.        Medications Prescribed:  Current Discharge Medication List              Supplementary Documentation:

## 2024-10-19 NOTE — PLAN OF CARE
Pt arrived on unit at 0020 from [facility] ED c/o abd pain/N/V/D. C-Diff r/o ordered. Pt is A&O x 4. Pt's lungs are clear, breathing unlabored. Vital signs are stable. Pt's diet is reg. Pt is continent of bladder and bowel. Pt has PRN medication to control pain in abdomen that radiates to shoulder. Pt ambulates independently. Skin admission assessment was performed with [facility]CEPCION Liriano and revealed IV sites on Lt AC and no wounds; skin intact. Pt is good historian. Admission navigator completed, med reconciliation reviewed. POC discussed with pt who verbalized understanding. NOC safety plan in place, bed in low position, call light and personal items within reach, pt encouraged to call for assistance.

## 2024-10-19 NOTE — PROGRESS NOTES
[facility]   part of [facility]     Hospitalist Progress Note     Cecily Garsia Patient Status:  Inpatient    1990 MRN [id number]   Location [facility] 3NW-A Attending Vandana Coto MD   Hosp Day # 0 PCP None Pcp     Chief Complaint: abd pain     Subjective:     Patient with no further diarrhea. + abd pain still per patient and bloating. No N.V after eating this am     Objective:    Review of Systems:   A comprehensive review of systems was completed; pertinent positive and negatives stated in subjective.    Vital signs:  Temp:  [98 °F (36.7 °C)-98.9 °F (37.2 °C)] 98 °F (36.7 °C)  Pulse:  [59-92] 62  Resp:  [15-19] 16  BP: ()/(54-80) 94/54  SpO2:  [98 %-100 %] 98 %    Physical Exam:    General: No acute distress  Respiratory: No wheezes, no rhonchi  Cardiovascular: S1, S2, regular rate and rhythm  Abdomen: Soft, Non-tender, non-distended, positive bowel sounds  Neuro: No new focal deficits.   Extremities: No edema      Diagnostic Data:    Labs:  Recent Labs   Lab 10/18/24  2027 10/19/24  0545   WBC 8.0 6.6   HGB 12.5 10.9*   MCV 97.6 96.0   .0 224.0       Recent Labs   Lab 10/18/24  2027 10/19/24  0545   * 97   BUN 7* 5*   CREATSERUM 0.75 0.64   CA 8.8 8.9   ALB 3.8  --     139   K 3.9 4.0    108   CO2 28.0 25.0   ALKPHO 53  --    AST 11*  --    ALT 22  --    BILT 0.3  --    TP 7.3  --        Estimated Creatinine Clearance: 109 mL/min (based on SCr of 0.64 mg/dL).    No results for input(s): \"TROP\", \"TROPHS\", \"CK\" in the last 168 hours.    No results for input(s): \"PTP\", \"INR\" in the last 168 hours.               Microbiology    No results found for this visit on 10/18/24.      Imaging: Reviewed in Epic.    Medications:    escitalopram  20 mg Oral Daily    OXcarbazepine  600 mg Oral BID    enoxaparin  40 mg Subcutaneous Daily       Assessment & Plan:      # abdominal pain, ? Enteritis   - stool study pending   - pain control   - soft diet   - CT AP  re-reviewed with patient   - consider GI eval per course     # diarrhea   - as above     # chronic pain due to underlying endometriosis   # depression ,lexapro  # hx of Epilepsy , trileptal   # hx of migraine       Vandana Coto MD    Supplementary Documentation:     Quality:  DVT Mechanical Prophylaxis:   SCDs,    DVT Pharmacologic Prophylaxis   Medication    enoxaparin (Lovenox) 40 MG/0.4ML SUBQ injection 40 mg                Code Status: Not on file  Sim: No urinary catheter in place  Sim Duration (in days):   Central line:    SONJA:     Discharge is dependent on: course  At this point Ms. Garsia is expected to be discharge to: home     The 21st Century Cures Act makes medical notes like these available to patients in the interest of transparency. Please be advised this is a medical document. Medical documents are intended to carry relevant information, facts as evident, and the clinical opinion of the practitioner. The medical note is intended as peer to peer communication and may appear blunt or direct. It is written in medical language and may contain abbreviations or verbiage that are unfamiliar.

## 2024-10-20 PROBLEM — R56.9 SEIZURE-LIKE ACTIVITY (HCC): Status: ACTIVE | Noted: 2024-10-20

## 2024-10-20 LAB — GLUCOSE BLD-MCNC: 139 MG/DL (ref 70–99)

## 2024-10-20 PROCEDURE — 99233 SBSQ HOSP IP/OBS HIGH 50: CPT | Performed by: INTERNAL MEDICINE

## 2024-10-20 PROCEDURE — 99254 IP/OBS CNSLTJ NEW/EST MOD 60: CPT | Performed by: OTHER

## 2024-10-20 RX ORDER — LORAZEPAM 2 MG/ML
1 INJECTION INTRAMUSCULAR
Status: DISCONTINUED | OUTPATIENT
Start: 2024-10-20 | End: 2024-10-23

## 2024-10-20 RX ORDER — FAMOTIDINE 20 MG/1
20 TABLET, FILM COATED ORAL 2 TIMES DAILY
Status: DISCONTINUED | OUTPATIENT
Start: 2024-10-20 | End: 2024-10-21

## 2024-10-20 RX ORDER — LOPERAMIDE HYDROCHLORIDE 2 MG/1
2 CAPSULE ORAL 4 TIMES DAILY PRN
Status: DISCONTINUED | OUTPATIENT
Start: 2024-10-20 | End: 2024-10-23

## 2024-10-20 RX ORDER — LORAZEPAM 2 MG/ML
0.5 INJECTION INTRAMUSCULAR ONCE
Status: COMPLETED | OUTPATIENT
Start: 2024-10-20 | End: 2024-10-20

## 2024-10-20 RX ORDER — LAMOTRIGINE 25 MG/1
25 TABLET ORAL DAILY
Status: COMPLETED | OUTPATIENT
Start: 2024-10-20 | End: 2024-10-22

## 2024-10-20 NOTE — SIGNIFICANT EVENT
RRT called for seizure reported per boyfriend who is at bedside.     Medical team arrived to bedside and evaluated patient quickly, including neurologist.     Patient awake, responsive and able to provide history and complaining of generalized pain and spasm especially in her abdomen.  She states that her heavy.  An abdominal pain triggers her seizures.  Her boyfriend stating that she has been having daily seizures at home.  Per patient she has been compliant with her antiepileptic as outpatient.  She seems anxious at this time.    Per neurologist plan is to adjust her antiepileptic medication and consider EEG tomorrow.     Ativan 0.5 mg for anxiety to be given now.   Hold off on transfer to tele however if recurrent will transfer to CTU as d./w nursing staff.       Case discussed with Dr. Sylvester (neuro) and Dr. Kapadia (GI).     > 30 minutes RRT, coordination of care.     Vandana Coto MD

## 2024-10-20 NOTE — PLAN OF CARE
Received pt at 1930. Pt is A&O x 4; follows commands, life partner to stay at bedside. Pt is on RA, VSS, soft diet. Pt is continent of bowel and bladder; c-diff r/u completed. Pt's pain is constant, c/o abd pain radiating to shoulder. Pt ambulates independently. IV access is patent infusing 0.9 NS at 100 ml/hr. Shift assessment performed, HS meds given. NOC safety plan in place; bed in low position, bed alarm refused but family to stay at bedside, call light and personal items within reach, pt encouraged to call staff for assistance.    *at 2340, pt put call light on and life partner reported she was having a seizure. Staff was in room within 15 seconds and pt said she was already done. Pt was immediately coherent, very talkative and began explaining her common extremely short  episode symptoms, insurance issues, the reasons why she had not completed OP MRIs ordered prior to admission, and that the precipitating reason why she started seizing was due to pain. Despite what appeared to be a quick recovery, a Rapid was called. Ativan Q2 for seizures was ordered along with neurology consult in am. Pt was encouraged to try to sleep to rest and recover though pt states she does not feel tired at all.

## 2024-10-20 NOTE — PROGRESS NOTES
[facility]   part of [facility]     Hospitalist Progress Note     Cecily Garsia Patient Status:  Inpatient    1990 MRN [id number]   Location [facility] 3NW-A Attending Vandana Coto MD   Hosp Day # 1 PCP None Pcp     Chief Complaint: abd pain     Subjective:     Patient with diarrhea, abd pain still. Eating breakfast this am.   Reported seizure overnight, feels drowsy    Boyfriend at bedside.     Objective:    Review of Systems:   A comprehensive review of systems was completed; pertinent positive and negatives stated in subjective.    Vital signs:  Temp:  [97.7 °F (36.5 °C)-98.7 °F (37.1 °C)] 97.7 °F (36.5 °C)  Pulse:  [55-78] 55  Resp:  [13-20] 16  BP: ()/(45-91) 94/55  SpO2:  [98 %-100 %] 98 %    Physical Exam:    General: No acute distress  Respiratory: No wheezes, no rhonchi  Cardiovascular: S1, S2, regular rate and rhythm  Abdomen: Soft, Non-tender, non-distended, positive bowel sounds  Neuro: No new focal deficits.   Extremities: No edema      Diagnostic Data:    Labs:  Recent Labs   Lab 10/18/24  2027 10/19/24  0545   WBC 8.0 6.6   HGB 12.5 10.9*   MCV 97.6 96.0   .0 224.0       Recent Labs   Lab 10/18/24  2027 10/19/24  0545   * 97   BUN 7* 5*   CREATSERUM 0.75 0.64   CA 8.8 8.9   ALB 3.8  --     139   K 3.9 4.0    108   CO2 28.0 25.0   ALKPHO 53  --    AST 11*  --    ALT 22  --    BILT 0.3  --    TP 7.3  --        Estimated Creatinine Clearance: 109 mL/min (based on SCr of 0.64 mg/dL).    No results for input(s): \"TROP\", \"TROPHS\", \"CK\" in the last 168 hours.    No results for input(s): \"PTP\", \"INR\" in the last 168 hours.               Microbiology    No results found for this visit on 10/18/24.      Imaging: Reviewed in Epic.    Medications:    escitalopram  20 mg Oral Daily    OXcarbazepine  600 mg Oral BID    enoxaparin  40 mg Subcutaneous Daily       Assessment & Plan:      # abdominal pain, ? Enteritis   - stool study neg, Cdiff neg   - pain  control   - soft diet   - CT AP noted   - consider GI eval per course     # diarrhea   - as above   - imodium     # chronic pain due to underlying endometriosis   # depression ,lexapro  # seizure   -  trileptal level pending   - neuro consulted     # hx of migraine       Vandana Coto MD    Supplementary Documentation:     Quality:  DVT Mechanical Prophylaxis:   SCDs,    DVT Pharmacologic Prophylaxis   Medication    enoxaparin (Lovenox) 40 MG/0.4ML SUBQ injection 40 mg                Code Status: Not on file  Sim: No urinary catheter in place  Sim Duration (in days):   Central line:    SONJA:     Discharge is dependent on: course  At this point Ms. Garsia is expected to be discharge to: home     The 21st Century Cures Act makes medical notes like these available to patients in the interest of transparency. Please be advised this is a medical document. Medical documents are intended to carry relevant information, facts as evident, and the clinical opinion of the practitioner. The medical note is intended as peer to peer communication and may appear blunt or direct. It is written in medical language and may contain abbreviations or verbiage that are unfamiliar.

## 2024-10-20 NOTE — CONSULTS
[facility]       Report of Consultation    Cecily Garsia Patient Status:  Inpatient    1990 MRN [id number]   Location [facility] 3NW-A Attending Vandana Coto MD   Hosp Day # 1 PCP None Pcp     Date of Admission:  10/18/2024    ICD-10-CM    1. Abdominal pain, acute  R10.9         Seizure like episodes. History of seizure disorder  Suspecting non- epileptic events    Discussed with the patient the possibilities and likely that the anxiety and abdominal pain could be the potential trigger.  She would like to change Oxcarbazepine as she thinks it is not working.   Advise her to stay on Oxcarbazepine for now   and discussed with Dr Zarate her primary neurologist given on going episodes.    Will recheck Oxcarbazepine level given diarrhea and missed doses at home.    Start Lamotrigine 25 mg daily x 3 days then 50 mg daily for both anxiety/mood and seizure prevention. No Topiramate due to kidney stones    We will get an extended EEG and also MRI brain that was planned as outpatient    2. Anxiety  Continue home Lexapro 20 mg daily  May benefit from psychotherapy    3. Abdominal pain ? Enteritis  Management per Hospitalist      Thank you for allowing us to participate in your patient's care.       Aj Sylvester MD   [facility]      This note was prepared using Dragon Medical voice recognition dictation software. As a result errors may occur. When identified these errors have been corrected. While every attempt is made to correct errors during dictation discrepancies may still exist      Reason for Consultation:  Seizure    History of Present Illness:  Cecily Garsia is a a(n) 34 year old female with history of seizure disorder and anxiety, bipolar depression, ADD  admitted with abdominal pain. Pain has been going on for 10 days, reports has hx of endometriosis and pelvic congestion. She is also been having episodes of diarrhea and nausea.   Last night patient had a seizure  episode per nursing staff- 15 seconds and then immediately coherent and was able to explain the episodes and other issues. Patient is on Oxcarbazepine at home and reports missed couple doses.   This afternoon had rapid response called in for seizure like activity. By the time this examiner reached patient was awake and alert, c/o pain in lower abdomen and then all over, coherent and answered all my questions appropriately. States she was taking with her partner they were talking and laughing, she felt a head almanzar and then Andrew states her body \"lock up\" hands and legs got stiff lasted for few seconds, he alerted the nurse and patient recovered quickly.  Patient states she has been having similar episodes, saw Dr Zarate my associate and he recommended MRI brain and EEG as outpatient. Apparently has been having episodes since 2022    History:  Past Medical History:    Endometriosis    Epilepsy (HCC)    Kidney stones    Pelvic congestion syndrome     Past Surgical History:   Procedure Laterality Date    Laparoscopic assisted  01/10/2014    varicose vein on ovaries, minimal endometriotic lesions cul de sac, normal ovaries/tubes    Other  01/10/2014    laproscopic endometrial     Family History   Problem Relation Age of Onset    High Blood Pressure Father     Breast Cancer Paternal Grandmother     Diabetes Paternal Grandmother     High Blood Pressure Paternal Grandfather     Cancer Paternal Grandfather         gall bladder    Breast Cancer Other         paternal aunt      reports that she quit smoking about 4 years ago. Her smoking use included cigarettes. She has been exposed to tobacco smoke. She has never used smokeless tobacco. She reports that she does not drink alcohol and does not use drugs.    Allergies:  Allergies[1]    Medications:    Current Facility-Administered Medications:     LORazepam (Ativan) 2 mg/mL injection 1 mg, 1 mg, Intravenous, Q2H PRN    loperamide (Imodium) cap 2 mg, 2 mg, Oral, QID PRN     famotidine (Pepcid) tab 20 mg, 20 mg, Oral, BID    butalbital-acetaminophen-caffeine (Fioricet) -40 MG per tab 1 tablet, 1 tablet, Oral, Q4H PRN    escitalopram (Lexapro) tab 20 mg, 20 mg, Oral, Daily    diazePAM (Valium) tab 2 mg, 2 mg, Oral, Q12H PRN    OXcarbazepine (Trileptal) tab 600 mg, 600 mg, Oral, BID    melatonin tab 3 mg, 3 mg, Oral, Nightly PRN    sodium chloride 0.9% infusion, , Intravenous, Continuous    enoxaparin (Lovenox) 40 MG/0.4ML SUBQ injection 40 mg, 40 mg, Subcutaneous, Daily    acetaminophen (Tylenol Extra Strength) tab 500 mg, 500 mg, Oral, Q4H PRN    ondansetron (Zofran) 4 MG/2ML injection 4 mg, 4 mg, Intravenous, Q6H PRN    prochlorperazine (Compazine) 10 MG/2ML injection 5 mg, 5 mg, Intravenous, Q8H PRN    polyethylene glycol (PEG 3350) (Miralax) 17 g oral packet 17 g, 17 g, Oral, Daily PRN    ketorolac (Toradol) 15 MG/ML injection 15 mg, 15 mg, Intravenous, Q6H PRN **OR** ketorolac (Toradol) 30 MG/ML injection 30 mg, 30 mg, Intravenous, Q6H PRN    HYDROmorphone (Dilaudid) 1 MG/ML injection 0.2 mg, 0.2 mg, Intravenous, Q2H PRN **OR** HYDROmorphone (Dilaudid) 1 MG/ML injection 0.4 mg, 0.4 mg, Intravenous, Q2H PRN **OR** HYDROmorphone (Dilaudid) 1 MG/ML injection 0.8 mg, 0.8 mg, Intravenous, Q2H PRN    HYDROcodone-acetaminophen (Norco)  MG per tab 1 tablet, 1 tablet, Oral, Q4H PRN    Review of Systems:  A 10-point system was reviewed.  Pertinent positives and negatives are noted in HPI.      Physical Exam:  Blood pressure 118/77, pulse 99, temperature 97.7 °F (36.5 °C), temperature source Oral, resp. rate 18, height 64\", weight 124 lb (56.2 kg), last menstrual period 09/27/2024, SpO2 100%, not currently breastfeeding.    GENERAL:  Patient is a(n) 34 year old female in no acute distress.  HEENT:  Normocephalic, atraumatic  LUNGS: Clear to auscultation bilaterally.   HEART:  S1, S2, Regular rate and rhythm.  NEUROLOGICAL:  This patient is alert and orientated x 3.  Speech  is  fluent and intact comprehension.   CN:  Face is symmetrical. .  Pupils equally round and reactive to light.  3+ brisk bilaterally.  EOMs intact.  Visual fields are full.  Tongue is midline.  Uvula and palate elevate symmetrically.  Shrug shoulders normally bilaterally.  The rest of the cranial nerves are grossly intact.  Sensation to light touch is intact bilaterally.  Motor:  Normal tone. No involuntary movement noted.   No focal  arm or leg weakness noted.    Finger-to-nose coordination is intact.    Gait deferred.    Diagnostic Data:   Recent Labs   Lab 10/18/24  2027 10/19/24  0545   RBC 3.68* 3.26*   HGB 12.5 10.9*   HCT 35.9 31.3*   MCV 97.6 96.0   MCH 34.0 33.4   MCHC 34.8 34.8   RDW 12.5 12.3   NEPRELIM 4.34 2.43   WBC 8.0 6.6   .0 224.0       Recent Labs   Lab 10/18/24  2027 10/19/24  0545   * 97   BUN 7* 5*   CREATSERUM 0.75 0.64   EGFRCR 108 120   CA 8.8 8.9   ALB 3.8  --     139   K 3.9 4.0    108   CO2 28.0 25.0   ALKPHO 53  --    AST 11*  --    ALT 22  --    BILT 0.3  --    TP 7.3  --        Imaging:    CT head: August 2024      FINDINGS:    VENTRICLES/SULCI:  Ventricles and sulci are normal in size.    INTRACRANIAL:  There are no abnormal extraaxial fluid collections.  There is no midline shift.  There are no intraparenchymal brain abnormalities.  There is nothing specific for acute infarct.  There is no hemorrhage or mass lesion.    SINUSES:           No sign of acute sinusitis.    MASTOIDS:          No sign of acute inflammation.  SKULL:             No evidence for fracture or osseous abnormality.  OTHER:             None.                   Impression   CONCLUSION:  No acute intracranial abnormality.            Assessment/Plan:  Patient Active Problem List   Diagnosis    Concussion wth loss of consciousness of 30 minutes or less    Attention deficit disorder (ADD) in adult    Bipolar 2 disorder (HCC)    Chronic bilateral thoracic back pain    Endometriosis    Fibrocystic  breast changes of both breasts    Pelvic congestion    Seizure (HCC)    Abnormal uterine bleeding (AUB)    Menometrorrhagia    Chronic pelvic pain in female    Convulsions (HCC)    Abdominal pain, acute    Diarrhea of presumed infectious origin    Moderate episode of recurrent major depressive disorder (HCC)           Aj Sylvestre MD  10/20/2024  2:27 PM       [1] No Known Allergies

## 2024-10-20 NOTE — PLAN OF CARE
Pt a&o x 4.   Tearful after seizure like activity this afternoon  Boyfriend @ bedside.  Participating & helpful with plan of care  Tolerating soft diet  Voiding w/out difficulty  Norco given am for c/o pain  Hospitalist here this am & spoke with pt re: GI consult.  GI aware of consult  New orders noted per neuro for trileptal level.  Awaiting results    1400   boyfriend came out to [place] to get this writer, stating 'she's having a seizure!'.   Walked into pt's room, pt leaning to left site, arms and legs stiff.   Sats = 100% on room air.   Respirations slightly shallow.  No anginal breathing noted.  Episode lasted apx 15-20 seconds.     RRT called & team @ bedside within a couple of minutes, followed by   Hospitalist & neurology shortly afterwards  Pt a&o x 4, but c/o feeling forgetful.     Ativan given, as ordered by hositalist.  No further seizure like activity noted

## 2024-10-21 ENCOUNTER — APPOINTMENT (OUTPATIENT)
Dept: NUCLEAR MEDICINE | Facility: HOSPITAL | Age: 34
End: 2024-10-21
Attending: INTERNAL MEDICINE
Payer: MEDICAID

## 2024-10-21 ENCOUNTER — APPOINTMENT (OUTPATIENT)
Dept: MRI IMAGING | Facility: HOSPITAL | Age: 34
End: 2024-10-21
Attending: Other
Payer: MEDICAID

## 2024-10-21 ENCOUNTER — NURSE ONLY (OUTPATIENT)
Dept: ELECTROPHYSIOLOGY | Facility: HOSPITAL | Age: 34
End: 2024-10-21
Attending: Other
Payer: MEDICAID

## 2024-10-21 ENCOUNTER — APPOINTMENT (OUTPATIENT)
Dept: ULTRASOUND IMAGING | Facility: HOSPITAL | Age: 34
End: 2024-10-21
Attending: STUDENT IN AN ORGANIZED HEALTH CARE EDUCATION/TRAINING PROGRAM
Payer: MEDICAID

## 2024-10-21 PROCEDURE — 70551 MRI BRAIN STEM W/O DYE: CPT | Performed by: OTHER

## 2024-10-21 PROCEDURE — 76700 US EXAM ABDOM COMPLETE: CPT | Performed by: STUDENT IN AN ORGANIZED HEALTH CARE EDUCATION/TRAINING PROGRAM

## 2024-10-21 PROCEDURE — 99232 SBSQ HOSP IP/OBS MODERATE 35: CPT

## 2024-10-21 PROCEDURE — 99232 SBSQ HOSP IP/OBS MODERATE 35: CPT | Performed by: INTERNAL MEDICINE

## 2024-10-21 PROCEDURE — 78227 HEPATOBIL SYST IMAGE W/DRUG: CPT | Performed by: INTERNAL MEDICINE

## 2024-10-21 RX ORDER — LAMOTRIGINE 25 MG/1
50 TABLET ORAL DAILY
Status: DISCONTINUED | OUTPATIENT
Start: 2024-10-23 | End: 2024-10-23

## 2024-10-21 RX ORDER — MORPHINE SULFATE 2 MG/ML
2 INJECTION, SOLUTION INTRAMUSCULAR; INTRAVENOUS ONCE
Status: COMPLETED | OUTPATIENT
Start: 2024-10-21 | End: 2024-10-21

## 2024-10-21 NOTE — PLAN OF CARE
Pt vitals stable, A&O x4. Pt denied chest pain and shortness of breath. Continuous pulse ox in place. IVF infusing. Tolerating diet without nausea. Bed locked in low position, call light in reach, rounding provided. Pt updated on care plan. All questions and concerns addressed.     1944: call received from [facility] that pts  told her that she was having a seizure. Brandy stated that she witnessed about 10 seconds of seizure like activity. Afterwards, pt was able to speak and communicate with [employer]. This RN went to assess the pt post seizure like activity. Pt was talkative, A&O x4 and seemed a little drowsy. Pt stated feeling scared and \"weird\" afterwards. Hospitalist and Neurology updated, orders received.     2134: report called to [facility]. Pt transferred via bed with transport and this RN.

## 2024-10-21 NOTE — PLAN OF CARE
Assumed care at 0730  A/Ox4. RA. NSR on tele   Denies pain at this time   0.9 infusing at 100ml/hr   SBA   US abd completed this AM  Plan for EEG and MRI today  Safety precautions in place. All needs met at this time       Problem: GASTROINTESTINAL - ADULT  Goal: Minimal or absence of nausea and vomiting  Description: INTERVENTIONS:  - Maintain adequate hydration with IV or PO as ordered and tolerated  - Nasogastric tube to low intermittent suction as ordered  - Evaluate effectiveness of ordered antiemetic medications  - Provide nonpharmacologic comfort measures as appropriate  - Advance diet as tolerated, if ordered  - Obtain nutritional consult as needed  - Evaluate fluid balance  Outcome: Progressing  Goal: Maintains or returns to baseline bowel function  Description: INTERVENTIONS:  - Assess bowel function  - Maintain adequate hydration with IV or PO as ordered and tolerated  - Evaluate effectiveness of GI medications  - Encourage mobilization and activity  - Obtain nutritional consult as needed  - Establish a toileting routine/schedule  - Consider collaborating with pharmacy to review patient's medication profile  Outcome: Progressing     Problem: METABOLIC/FLUID AND ELECTROLYTES - ADULT  Goal: Electrolytes maintained within normal limits  Description: INTERVENTIONS:  - Monitor labs and rhythm and assess patient for signs and symptoms of electrolyte imbalances  - Administer electrolyte replacement as ordered  - Monitor response to electrolyte replacements, including rhythm and repeat lab results as appropriate  - Fluid restriction as ordered  - Instruct patient on fluid and nutrition restrictions as appropriate  Outcome: Progressing

## 2024-10-21 NOTE — PROGRESS NOTES
[facility] Gastroenterology Progress Note    CC: abd pain   S: Pt continues to have abd pain   O: /75 (BP Location: Right arm)   Pulse 66   Temp 98.2 °F (36.8 °C) (Oral)   Resp 18   Ht 5' 4\" (1.626 m)   Wt 124 lb (56.2 kg)   LMP 09/27/2024 (Exact Date)   SpO2 96%   BMI 21.28 kg/m²     Gen: NAD  Abd: (+)BS, soft, mildly tender to palpation diffusely non-distended; no rebound or guarding    Laboratory/Imaging Data:       Recent Labs   Lab 10/19/24  2347 10/20/24  1358   PGLU 100* 139*     No results for input(s): \"INR\" in the last 168 hours.      Recent Labs   Lab 10/18/24  2027 10/19/24  0545   WBC 8.0 6.6   HGB 12.5 10.9*   .0 224.0       Recent Labs   Lab 10/18/24  2027 10/19/24  0545    139   K 3.9 4.0    108   CO2 28.0 25.0   BUN 7* 5*   CREATSERUM 0.75 0.64       Recent Labs   Lab 10/18/24  2027   ALT 22   AST 11*       Assessment/Plan: 34-year-old female presenting for evaluation of abdominal pain.  Patient admitted to hospital for concern for seizure versus pseudoseizure.  -Ultrasound abdomen with no gallbladder wall thickening; will look to obtain HIDA scan given prior inconsistent sees and imaging of the gallbladder and persistent abdominal pain  -Would warrant EGD and colonoscopy for evaluation of abdominal pain; will look to consider timing of this based on her neurologic workup and clinical progression  -Appreciate neurology recommendations and continued workup  -Stop famotidine, start pantoprazole 40 mg once a day    Ian Banks MD, 10/21/24, 9:46 AM  [facility] Gastroenterology

## 2024-10-21 NOTE — PROGRESS NOTES
[facility]   part of [facility]     Hospitalist Progress Note     Cecily Garsia Patient Status:  Inpatient    1990 MRN [id number]   Location [facility] 3NE-A Attending Vandana Coto MD   Hosp Day # 2 PCP None Pcp     Chief Complaint: tired, abd pain , diarrhea     Subjective:     Patient transferred to CTU 3 for reported seizure activity which was 10 sec long. Pt awake and not postictal per reports.     Objective:    Review of Systems:   A comprehensive review of systems was completed; pertinent positive and negatives stated in subjective.    Vital signs:  Temp:  [98.1 °F (36.7 °C)-98.4 °F (36.9 °C)] 98.4 °F (36.9 °C)  Pulse:  [64-99] 64  Resp:  [16-24] 20  BP: (112-127)/(69-87) 121/83  SpO2:  [94 %-100 %] 94 %    Physical Exam:    General: No acute distress  Respiratory: No wheezes, no rhonchi  Cardiovascular: S1, S2, regular rate and rhythm  Abdomen: Soft, Non-tender, non-distended, positive bowel sounds  Neuro: No new focal deficits.   Extremities: No edema      Diagnostic Data:    Labs:  Recent Labs   Lab 10/18/24  2027 10/19/24  0545   WBC 8.0 6.6   HGB 12.5 10.9*   MCV 97.6 96.0   .0 224.0       Recent Labs   Lab 10/18/24  2027 10/19/24  0545   * 97   BUN 7* 5*   CREATSERUM 0.75 0.64   CA 8.8 8.9   ALB 3.8  --     139   K 3.9 4.0    108   CO2 28.0 25.0   ALKPHO 53  --    AST 11*  --    ALT 22  --    BILT 0.3  --    TP 7.3  --        Estimated Creatinine Clearance: 109 mL/min (based on SCr of 0.64 mg/dL).    No results for input(s): \"TROP\", \"TROPHS\", \"CK\" in the last 168 hours.    No results for input(s): \"PTP\", \"INR\" in the last 168 hours.       Microbiology    No results found for this visit on 10/18/24.      Imaging: Reviewed in Epic.    Medications:    famotidine  20 mg Oral BID    lamoTRIgine  25 mg Oral Daily    escitalopram  20 mg Oral Daily    OXcarbazepine  600 mg Oral BID    enoxaparin  40 mg Subcutaneous Daily       Assessment & Plan:       #Abdominal pain, unclear etiology. ? Functional   - US abd pending result   - stool study neg, Cdiff neg   - pain control   - soft diet   - CT AP noted   - GI rec appreciated, plan for scopes tomorrow?     # diarrhea   - as above   - imodium      # chronic pain due to underlying endometriosis   # depression ,lexapro  # seizure vs pseudoseizure   -  trileptal level pending   - lamotrigine added   - EEG  - neuro rec appreciated      # hx of migraine       Vandana Coto MD    Supplementary Documentation:     Quality:  DVT Mechanical Prophylaxis:   SCDs,    DVT Pharmacologic Prophylaxis   Medication    enoxaparin (Lovenox) 40 MG/0.4ML SUBQ injection 40 mg                Code Status: Not on file  Sim: No urinary catheter in place  Sim Duration (in days):   Central line:    SONJA:     Discharge is dependent on: course  At this point Ms. Garsia is expected to be discharge to: home     The 21st Century Cures Act makes medical notes like these available to patients in the interest of transparency. Please be advised this is a medical document. Medical documents are intended to carry relevant information, facts as evident, and the clinical opinion of the practitioner. The medical note is intended as peer to peer communication and may appear blunt or direct. It is written in medical language and may contain abbreviations or verbiage that are unfamiliar.

## 2024-10-21 NOTE — PLAN OF CARE
Assumed care of pt at 2230  Pt A&Ox4, on RA, NSR on tele  Pt denies n/v or SOB at this time  Pt reports pain, prn medications given per MAR  Pt NPO at midnight pending EEG, MRI  0.9NS at 100 ml/hr  Noncardiac electrolyte replacement protocol  Safety and seizure precautions maintained  All needs met at this time     0035 - pt called this writer to room reporting feelings of pain and anxiety. Pt states \"I usually feel like this right before a seizure happens.\" Pt then went into seizure like activity/shaking, HR in 130s, prn ativan given per MAR. Activity lasted 10-15 seconds. Afterwards, pt was asking where she was and who was in the room with her - fully alert and communicating with staff    Problem: GASTROINTESTINAL - ADULT  Goal: Minimal or absence of nausea and vomiting  Description: INTERVENTIONS:  - Maintain adequate hydration with IV or PO as ordered and tolerated  - Nasogastric tube to low intermittent suction as ordered  - Evaluate effectiveness of ordered antiemetic medications  - Provide nonpharmacologic comfort measures as appropriate  - Advance diet as tolerated, if ordered  - Obtain nutritional consult as needed  - Evaluate fluid balance  Outcome: Progressing  Goal: Maintains or returns to baseline bowel function  Description: INTERVENTIONS:  - Assess bowel function  - Maintain adequate hydration with IV or PO as ordered and tolerated  - Evaluate effectiveness of GI medications  - Encourage mobilization and activity  - Obtain nutritional consult as needed  - Establish a toileting routine/schedule  - Consider collaborating with pharmacy to review patient's medication profile  Outcome: Progressing     Problem: METABOLIC/FLUID AND ELECTROLYTES - ADULT  Goal: Electrolytes maintained within normal limits  Description: INTERVENTIONS:  - Monitor labs and rhythm and assess patient for signs and symptoms of electrolyte imbalances  - Administer electrolyte replacement as ordered  - Monitor response to electrolyte  replacements, including rhythm and repeat lab results as appropriate  - Fluid restriction as ordered  - Instruct patient on fluid and nutrition restrictions as appropriate  Outcome: Progressing     Problem: Patient/Family Goals  Goal: Patient/Family Long Term Goal  Description: Patient's Long Term Goal: to go home    Interventions:  - coordinate appropriate care  - See additional Care Plan goals for specific interventions  Outcome: Progressing     Problem: PAIN - ADULT  Goal: Verbalizes/displays adequate comfort level or patient's stated pain goal  Description: INTERVENTIONS:  - Encourage pt to monitor pain and request assistance  - Assess pain using appropriate pain scale  - Administer analgesics based on type and severity of pain and evaluate response  - Implement non-pharmacological measures as appropriate and evaluate response  - Consider cultural and social influences on pain and pain management  - Manage/alleviate anxiety  - Utilize distraction and/or relaxation techniques  - Monitor for opioid side effects  - Notify MD/LIP if interventions unsuccessful or patient reports new pain  - Anticipate increased pain with activity and pre-medicate as appropriate  Outcome: Progressing

## 2024-10-21 NOTE — CONSULTS
[facility]  Report of GI Consultation    Cecily Garsia Patient Status:  Inpatient    1990 MRN [id number]   Location [facility] 3NW-A Attending Vandana Coto MD   Hosp Day # 1 PCP None Pcp     Date of Admission:  10/18/2024  Date of Consult:  10/20/2024  PCP: None Pcp    Reason for Consultation:   Abdominal pain    History of Present Illness:   Patient is a 34 year old female who was admitted to the hospital for Abdominal pain, acute:    We are consulted  for evaluation of abdominal pain.  Pt has history of endometriosis, says this pain is different.  When asked how long she has had this pain, responds with \"for a long F*#@'ing time\", has assoc diarrhea, not always together.  She also has bloating in the upper abdomen sometimes at the time of her menstrual cycle, other times unrelated.  She says pain sometimes related to food sometimes is not.  She has nausea and vomiting at time.    She has no weight loss, but does endorse unexplained weight gain.      Pain starts in R mid abdomen and radiates in a band to the periumbilical area and to the L mid abdomen.  The pain also radiates into the RUQ and epigastrium, goes to her R shoulder, sometimes causes tingling in her R arm.    She has no recent GI work-up.  She has no family history of IBD.  Says there is a family history of gallbladder problems.      CT scan from 10/10/2024 shows \"gallbladder wall thickening is noted and is probably related to non-distention.\"  CT scan 10/18/2024 shows \"gallbladder is unremarkable in appearance.\"    Currently patient is being evaluated for possible seizure activity by Neurology service.        Past Medical History  Past Medical History:    Endometriosis    Epilepsy (HCC)    Kidney stones    Pelvic congestion syndrome       Past Surgical History  Past Surgical History:   Procedure Laterality Date    Laparoscopic assisted  01/10/2014    varicose vein on ovaries, minimal endometriotic lesions cul de sac, normal  ovaries/tubes    Other  01/10/2014    laproscopic endometrial       Family History  Family History   Problem Relation Age of Onset    High Blood Pressure Father     Breast Cancer Paternal Grandmother     Diabetes Paternal Grandmother     High Blood Pressure Paternal Grandfather     Cancer Paternal Grandfather         gall bladder    Breast Cancer Other         paternal aunt       Social History  Pediatric History   Patient Parents    Ari Garsia (Father)     Other Topics Concern     Service Not Asked    Blood Transfusions Not Asked    Caffeine Concern Yes    Occupational Exposure Not Asked    Hobby Hazards Not Asked    Sleep Concern Not Asked    Stress Concern Not Asked    Weight Concern Not Asked    Special Diet Not Asked    Back Care Not Asked    Exercise Yes    Bike Helmet Not Asked    Seat Belt Not Asked    Self-Exams Not Asked   Social History Narrative    Not on file           Current Medications:  Current Facility-Administered Medications   Medication Dose Route Frequency    LORazepam (Ativan) 2 mg/mL injection 1 mg  1 mg Intravenous Q2H PRN    loperamide (Imodium) cap 2 mg  2 mg Oral QID PRN    famotidine (Pepcid) tab 20 mg  20 mg Oral BID    lamoTRIgine (LaMICtal) tab 25 mg  25 mg Oral Daily    butalbital-acetaminophen-caffeine (Fioricet) -40 MG per tab 1 tablet  1 tablet Oral Q4H PRN    escitalopram (Lexapro) tab 20 mg  20 mg Oral Daily    OXcarbazepine (Trileptal) tab 600 mg  600 mg Oral BID    melatonin tab 3 mg  3 mg Oral Nightly PRN    sodium chloride 0.9% infusion   Intravenous Continuous    enoxaparin (Lovenox) 40 MG/0.4ML SUBQ injection 40 mg  40 mg Subcutaneous Daily    acetaminophen (Tylenol Extra Strength) tab 500 mg  500 mg Oral Q4H PRN    ondansetron (Zofran) 4 MG/2ML injection 4 mg  4 mg Intravenous Q6H PRN    prochlorperazine (Compazine) 10 MG/2ML injection 5 mg  5 mg Intravenous Q8H PRN    polyethylene glycol (PEG 3350) (Miralax) 17 g oral packet 17 g  17 g Oral Daily  PRN    ketorolac (Toradol) 15 MG/ML injection 15 mg  15 mg Intravenous Q6H PRN    Or    ketorolac (Toradol) 30 MG/ML injection 30 mg  30 mg Intravenous Q6H PRN    HYDROmorphone (Dilaudid) 1 MG/ML injection 0.2 mg  0.2 mg Intravenous Q2H PRN    Or    HYDROmorphone (Dilaudid) 1 MG/ML injection 0.4 mg  0.4 mg Intravenous Q2H PRN    Or    HYDROmorphone (Dilaudid) 1 MG/ML injection 0.8 mg  0.8 mg Intravenous Q2H PRN    HYDROcodone-acetaminophen (Norco)  MG per tab 1 tablet  1 tablet Oral Q4H PRN       Allergies  Allergies[1]    Review of Systems:   A comprehensive 10 point review of systems was completed.  Pertinent positives and negatives noted in the the HPI.     Physical Exam:   Blood pressure 112/75, pulse 73, temperature 98.1 °F (36.7 °C), temperature source Oral, resp. rate 20, height 5' 4\" (1.626 m), weight 124 lb (56.2 kg), last menstrual period 09/27/2024, SpO2 99%, not currently breastfeeding.    GENERAL: NAD, oriented x 3, pleasant  HENT: Normal oral mucosa with good dentition, no ulcers.  EYES: No scleral icterus, no conjunctival pallor.  PULM: Lungs clear to auscultation anteriorly  CV: Regular, no murmurs, 2+ radial pulses  ABD: Diffusely tender with light palpation throughout the entire abdomen.  Mild to moderate gaseous distention in the lower abdomen.  +BS.  No fluid wave.    EXT: No edema, normal muscle mass  SKIN: No rash, no jaundice.    Results:     Laboratory Data:  Lab Results   Component Value Date    WBC 6.6 10/19/2024    HGB 10.9 (L) 10/19/2024    .0 10/19/2024    CREATSERUM 0.64 10/19/2024    BUN 5 (L) 10/19/2024     10/19/2024    K 4.0 10/19/2024    CO2 25.0 10/19/2024    CA 8.9 10/19/2024    ALB 3.8 10/18/2024    ALKPHO 53 10/18/2024    TP 7.3 10/18/2024    AST 11 (L) 10/18/2024    ALT 22 10/18/2024    BILT 0.3 10/18/2024    LIP 21 10/18/2024    CRP <0.29 07/16/2020    TROP <0.045 11/10/2019           No results for input(s): \"URINE\", \"CULTI\", \"BLDSMR\" in the last 168  hours.    Recent Labs   Lab 10/18/24  2027 10/19/24  0545   ALT 22  --    AST 11*  --    ALKPHO 53  --    BILT 0.3  --    HGB 12.5 10.9*   WBC 8.0 6.6       Imaging:  No results found.       Impression:   Chronic abdominal pain  Nausea and vomiting  Bloating  Diarrhea, neg for C diff and stool PCR  Family history of gallbladder problems    Recommendations:  Await neuro work-up  EGD and colonoscopy prior to discharge  US abdomen to better evaluate gallbladder given contradictory findings on prior CT scans from 10/10/2024 to 10/18/2024 (discrepency likely due to underdistention on initial CT scan)  NPO for US abdomen tomorrow.     Thank you for allowing me to participate in the care of your patient.    Carlyle Kapadia MD  10/20/2024       [1] No Known Allergies

## 2024-10-21 NOTE — PROGRESS NOTES
[facility]  LYLA Neurology Progress Note    Cecily Garsia Patient Status:  Inpatient    1990 MRN [id number]   Location [facility] 3NE-A Attending Vandana Coto MD   Hosp Day # 2 PCP None Pcp     CC: Abdominal pain, seizure-like episodes    Subjective:  Cecily Garsia is a  34 year old female with history of seizure disorder and anxiety, bipolar depression, ADD, who was admitted with abdominal pain 10/18. Pain has been going on for 10 days, reports has hx of endometriosis and pelvic congestion. She is also been having episodes of diarrhea and nausea.   Neurology was consulted 10/20 when the night before patient had a seizure episode, described  per nursing staff as long as 15 seconds and then immediately coherent and was able to explain the episodes and other issues. Patient is on Oxcarbazepine at home and reports missed couple doses.   As per dr. Sylvester's note: \"In the afternoon of 10/20 patient had rapid response called in for seizure like activity. By the time the neuro examiner reached patient was awake and alert, c/o pain in lower abdomen and then all over, coherent and answered all my questions appropriately. States she was taking with her partner they were talking and laughing, she felt a head almanzar and then Andrew states her body \"lock up\" hands and legs got stiff lasted for few seconds, he alerted the nurse and patient recovered quickly\".  Patient states she has been having similar episodes in the past.  Sees Dr Zarate as outpatient, he recommended MRI brain and EEG as outpatient which she had scheduled in a few weeks. Apparently has been having episodes since .    Seen for a follow up visit today. In bed, trying to sleep, needs to be NPO for GI testing later today, \"I am trying to sleep so I am not hungry\".  No seizure-like episodes overnight. Denies any changes in vision or speech, no headache, dizziness or light headedness.             MEDICATIONS:  No current  outpatient medications on file.     Current Facility-Administered Medications   Medication Dose Route Frequency    pantoprazole (Protonix) 40 mg in sodium chloride 0.9% PF 10 mL IV push  40 mg Intravenous Q24H    LORazepam (Ativan) 2 mg/mL injection 1 mg  1 mg Intravenous Q2H PRN    loperamide (Imodium) cap 2 mg  2 mg Oral QID PRN    lamoTRIgine (LaMICtal) tab 25 mg  25 mg Oral Daily    butalbital-acetaminophen-caffeine (Fioricet) -40 MG per tab 1 tablet  1 tablet Oral Q4H PRN    escitalopram (Lexapro) tab 20 mg  20 mg Oral Daily    OXcarbazepine (Trileptal) tab 600 mg  600 mg Oral BID    melatonin tab 3 mg  3 mg Oral Nightly PRN    sodium chloride 0.9% infusion   Intravenous Continuous    enoxaparin (Lovenox) 40 MG/0.4ML SUBQ injection 40 mg  40 mg Subcutaneous Daily    acetaminophen (Tylenol Extra Strength) tab 500 mg  500 mg Oral Q4H PRN    ondansetron (Zofran) 4 MG/2ML injection 4 mg  4 mg Intravenous Q6H PRN    prochlorperazine (Compazine) 10 MG/2ML injection 5 mg  5 mg Intravenous Q8H PRN    polyethylene glycol (PEG 3350) (Miralax) 17 g oral packet 17 g  17 g Oral Daily PRN    HYDROmorphone (Dilaudid) 1 MG/ML injection 0.2 mg  0.2 mg Intravenous Q2H PRN    Or    HYDROmorphone (Dilaudid) 1 MG/ML injection 0.4 mg  0.4 mg Intravenous Q2H PRN    Or    HYDROmorphone (Dilaudid) 1 MG/ML injection 0.8 mg  0.8 mg Intravenous Q2H PRN    HYDROcodone-acetaminophen (Norco)  MG per tab 1 tablet  1 tablet Oral Q4H PRN       REVIEW OF SYSTEMS:  A 10-point system was reviewed.  Pertinent positives and negatives are noted in HPI.      PHYSICAL EXAMINATION:  VITAL SIGNS: /75 (BP Location: Right arm)   Pulse 66   Temp 98.2 °F (36.8 °C) (Oral)   Resp 18   Ht 64\"   Wt 124 lb (56.2 kg)   LMP 09/27/2024 (Exact Date)   SpO2 96%   BMI 21.28 kg/m²   GENERAL:  Patient is a 34 year old female in no acute distress.  HEENT:  Normocephalic, atraumatic  ABD: Soft, non tender  SKIN: Warm, dry, no  hieu    NEUROLOGICAL:   Mental status: Oriented to person, place, and time   Speech: Fluent, no dysarthria  Memory and comprehension: Intact   Cranial Nerves: VFF, PERRL 3mm brisk, EOMI, no nystagmus, facial sensation intact, face symmetric, tongue midline, shoulder shrug equal, remainder CN intact  Motor: No drift, no focal arm or leg weakness. Normal tone, no involuntary movements noted.   Sensory: Intact to light touch bilaterally  Coordination: FTN intact bilaterally  Gait: Deferred      Imaging/Diagnostics:  US ABDOMEN COMPLETE (CPT=76700)    Result Date: 10/21/2024  CONCLUSION:  No evidence of gallstones, pericholecystic fluid or gallbladder wall thickening.  If there is persistent concern then consider follow-up imaging including HIDA scan.   LOCATION:  [facility]    Dictated by (CST): Lars Cruz MD on 10/21/2024 at 9:03 AM     Finalized by (CST): Lars Cruz MD on 10/21/2024 at 9:05 AM       CT ABDOMEN+PELVIS(CONTRAST ONLY)(CPT=74177)    Result Date: 10/18/2024  CONCLUSION:  Mild bladder wall thickening likely secondary to incomplete distension but correlation with urinalysis is recommended.   LOCATION:  [facility]   Dictated by (CST): Berna Castillo MD on 10/18/2024 at 9:40 PM     Finalized by (CST): Berna Castillo MD on 10/18/2024 at 9:43 PM       US PELVIS W DOPPLER (KHC=[id number])    Result Date: 10/11/2024  CONCLUSION:  Trace free fluid in the cul de sac.  Small amount of free fluid in the endometrial cavity.  No evidence of an adnexal mass or ovarian torsion.   LOCATION:  [facility]    Dictated by (CST): Shama Hartley MD on 10/11/2024 at 7:31 AM     Finalized by (CST): Shama Hartley MD on 10/11/2024 at 7:56 AM       CT ABDOMEN+PELVIS(CONTRAST ONLY)(CPT=74177)    Result Date: 10/10/2024  CONCLUSION:  1. There is no specific evidence of an acute abnormality in the abdomen or pelvis. 2. Small amount of free fluid in pelvis and corpus luteum cyst left ovary are physiologic findings.   LOCATION:  [facility]    Dictated by (CST): Dao Daley MD on 10/10/2024 at 11:13 PM     Finalized by (CST): Dao Daley MD on 10/10/2024 at 11:15 PM          Labs:  Recent Labs   Lab 10/18/24  2027 10/19/24  0545   RBC 3.68* 3.26*   HGB 12.5 10.9*   HCT 35.9 31.3*   MCV 97.6 96.0   MCH 34.0 33.4   MCHC 34.8 34.8   RDW 12.5 12.3   NEPRELIM 4.34 2.43   WBC 8.0 6.6   .0 224.0         Recent Labs   Lab 10/18/24  2027 10/19/24  0545   * 97   BUN 7* 5*   CREATSERUM 0.75 0.64   EGFRCR 108 120   CA 8.8 8.9    139   K 3.9 4.0    108   CO2 28.0 25.0       Pre-morbid mRS 0      Assessment and Plan:    A 34 years old female with:    Seizure-like episodes, in a setting of seizure disorder history, on Oxcarbazepine at home. Suspicious for non-epileptic event in a setting of abdomina pain as a potential trigger.  Continue home dose of Oxcarbazepine for now, discussed with dr. Zarate, patient's epileptologist  Given GI issues and reported missed doses, will check Oxcarbazepine level pending  Lamotrigine 25 mg daily x 3 days started 10/20 for both anxiety/mood as well as seizure prevention, increase to 50 mg daily on day 4 (10/23). No Topiramate due to kidney stones   Extended EEG completed 10/21 - per preliminary reading by dr. Torres, \"no seizures, no IEDs. Excessive beta frequencies\".  MRI brain - pending  Seizure precautions  Anxiety - continue home meds. May benefit from Psychotherapy as outpatient  Abdominal pain - being investigated by GI and Medicine.  Discussed with patient, all questions answered. Discussed with dr. Crowe. Will continue to follow pending above testing  Is this a shared or split note between Advanced Practice Provider and Physician? No       Alejandra Nuñez APRMARGARITA  [facility]  10/21/2024, 10:09 AM   Cesar # [id number]

## 2024-10-22 ENCOUNTER — ANESTHESIA EVENT (OUTPATIENT)
Dept: ENDOSCOPY | Facility: HOSPITAL | Age: 34
End: 2024-10-22
Payer: MEDICAID

## 2024-10-22 PROCEDURE — 99232 SBSQ HOSP IP/OBS MODERATE 35: CPT | Performed by: INTERNAL MEDICINE

## 2024-10-22 RX ORDER — LAMOTRIGINE 25 MG/1
50 TABLET ORAL DAILY
Qty: 60 TABLET | Refills: 2 | Status: SHIPPED | OUTPATIENT
Start: 2024-10-23

## 2024-10-22 NOTE — PLAN OF CARE
Assumed care of pt at 1930  Pt A&Ox4, on RA, NSR on tele  Pt denies n/v/d or SOB  Pt reports pain, medications given per MAR  0.9NS at 100 ml/hr  SBA, Soft diet  MRI pending  EEG and US abdomen resulted  Safety and seizure precautions maintained  All needs met at this time     2319 - MRI resulted    Problem: GASTROINTESTINAL - ADULT  Goal: Minimal or absence of nausea and vomiting  Description: INTERVENTIONS:  - Maintain adequate hydration with IV or PO as ordered and tolerated  - Nasogastric tube to low intermittent suction as ordered  - Evaluate effectiveness of ordered antiemetic medications  - Provide nonpharmacologic comfort measures as appropriate  - Advance diet as tolerated, if ordered  - Obtain nutritional consult as needed  - Evaluate fluid balance  Outcome: Progressing  Goal: Maintains or returns to baseline bowel function  Description: INTERVENTIONS:  - Assess bowel function  - Maintain adequate hydration with IV or PO as ordered and tolerated  - Evaluate effectiveness of GI medications  - Encourage mobilization and activity  - Obtain nutritional consult as needed  - Establish a toileting routine/schedule  - Consider collaborating with pharmacy to review patient's medication profile  Outcome: Progressing     Problem: METABOLIC/FLUID AND ELECTROLYTES - ADULT  Goal: Electrolytes maintained within normal limits  Description: INTERVENTIONS:  - Monitor labs and rhythm and assess patient for signs and symptoms of electrolyte imbalances  - Administer electrolyte replacement as ordered  - Monitor response to electrolyte replacements, including rhythm and repeat lab results as appropriate  - Fluid restriction as ordered  - Instruct patient on fluid and nutrition restrictions as appropriate  Outcome: Progressing    Problem: PAIN - ADULT  Goal: Verbalizes/displays adequate comfort level or patient's stated pain goal  Description: INTERVENTIONS:  - Encourage pt to monitor pain and request assistance  - Assess pain  using appropriate pain scale  - Administer analgesics based on type and severity of pain and evaluate response  - Implement non-pharmacological measures as appropriate and evaluate response  - Consider cultural and social influences on pain and pain management  - Manage/alleviate anxiety  - Utilize distraction and/or relaxation techniques  - Monitor for opioid side effects  - Notify MD/LIP if interventions unsuccessful or patient reports new pain  - Anticipate increased pain with activity and pre-medicate as appropriate  Outcome: Progressing

## 2024-10-22 NOTE — PROGRESS NOTES
[facility]  LYLA Neurology Progress Note    Cecily Garsia Patient Status:  Inpatient    1990 MRN [id number]   Location [facility] 3NE-A Attending Vandana Coto MD   Hosp Day # 3 PCP None Pcp     CC: Abdominal pain, seizure-like episodes     Subjective:  Patient seen for a follow up visit today. In bed, reports sever abdominal pain. Tearful, \" I just want to get better\". Patient reports having had another episode of about 5 seconds of rush to her head and felt her body stiffening. Remembers episode, was not confused or disoriented after. Currently, feeling tired. Denies any changes in vision or speech, no headache, dizziness or light headedness. No diarrhea today.         MEDICATIONS:  No current outpatient medications on file.     Current Facility-Administered Medications   Medication Dose Route Frequency    pantoprazole (Protonix) 40 mg in sodium chloride 0.9% PF 10 mL IV push  40 mg Intravenous Q24H    [START ON 10/23/2024] lamoTRIgine (LaMICtal) tab 50 mg  50 mg Oral Daily    LORazepam (Ativan) 2 mg/mL injection 1 mg  1 mg Intravenous Q2H PRN    loperamide (Imodium) cap 2 mg  2 mg Oral QID PRN    lamoTRIgine (LaMICtal) tab 25 mg  25 mg Oral Daily    butalbital-acetaminophen-caffeine (Fioricet) -40 MG per tab 1 tablet  1 tablet Oral Q4H PRN    escitalopram (Lexapro) tab 20 mg  20 mg Oral Daily    OXcarbazepine (Trileptal) tab 600 mg  600 mg Oral BID    melatonin tab 3 mg  3 mg Oral Nightly PRN    sodium chloride 0.9% infusion   Intravenous Continuous    enoxaparin (Lovenox) 40 MG/0.4ML SUBQ injection 40 mg  40 mg Subcutaneous Daily    acetaminophen (Tylenol Extra Strength) tab 500 mg  500 mg Oral Q4H PRN    ondansetron (Zofran) 4 MG/2ML injection 4 mg  4 mg Intravenous Q6H PRN    prochlorperazine (Compazine) 10 MG/2ML injection 5 mg  5 mg Intravenous Q8H PRN    polyethylene glycol (PEG 3350) (Miralax) 17 g oral packet 17 g  17 g Oral Daily PRN    HYDROmorphone (Dilaudid) 1 MG/ML  injection 0.2 mg  0.2 mg Intravenous Q2H PRN    Or    HYDROmorphone (Dilaudid) 1 MG/ML injection 0.4 mg  0.4 mg Intravenous Q2H PRN    Or    HYDROmorphone (Dilaudid) 1 MG/ML injection 0.8 mg  0.8 mg Intravenous Q2H PRN    HYDROcodone-acetaminophen (Norco)  MG per tab 1 tablet  1 tablet Oral Q4H PRN       REVIEW OF SYSTEMS:  A 10-point system was reviewed.  Pertinent positives and negatives are noted in HPI.      PHYSICAL EXAMINATION:  VITAL SIGNS: /66 (BP Location: Right arm)   Pulse 65   Temp 98 °F (36.7 °C) (Oral)   Resp 18   Ht 64\"   Wt 124 lb (56.2 kg)   LMP 09/27/2024 (Exact Date)   SpO2 94%   BMI 21.28 kg/m²   GENERAL:  Patient is a 34 year old female in no acute distress.  HEENT:  Normocephalic, atraumatic  ABD: Soft, non tender  SKIN: Warm, dry, no rashes    NEUROLOGICAL:   Mental status: Oriented to person, place, and time   Speech: Fluent, no dysarthria  Memory and comprehension: Intact   Cranial Nerves: VFF, PERRL 3mm brisk, EOMI, no nystagmus, facial sensation intact, face symmetric, tongue midline, shoulder shrug equal, remainder CN intact  Motor: No drift, no focal arm or leg weakness. Normal tone, no involuntary movements noted.   Sensory: Intact to light touch bilaterally  Coordination: FTN intact bilaterally  Gait: Deferred      Imaging/Diagnostics:  MRI BRAIN (CPT=70551)    Result Date: 10/21/2024  CONCLUSION:  No acute abnormality seen   LOCATION:  [facility]   Dictated by (CST): Jose Chairez MD on 10/21/2024 at 11:34 PM     Finalized by (CST): Jose Chairez MD on 10/21/2024 at 11:36 PM       NM GB HEPATOBILIARY SCAN WITH PHARMACY  (CPT=78227)    Result Date: 10/21/2024  CONCLUSION:  No sign of obstruction of the cystic duct or common bile duct.  Gallbladder does not initially visualized on the 1st set of imaging, but then after morphine was administered, the gallbladder clearly visualizes.   LOCATION:  [facility]   Dictated by (CST): Jose Chairez MD on 10/21/2024 at 5:37 PM      Finalized by (CST): Jose Chairez MD on 10/21/2024 at 5:39 PM       US ABDOMEN COMPLETE (CPT=76700)    Result Date: 10/21/2024  CONCLUSION:  No evidence of gallstones, pericholecystic fluid or gallbladder wall thickening.  If there is persistent concern then consider follow-up imaging including HIDA scan.   LOCATION:  [facility]    Dictated by (CST): Lars Cruz MD on 10/21/2024 at 9:03 AM     Finalized by (CST): Lars Cruz MD on 10/21/2024 at 9:05 AM       CT ABDOMEN+PELVIS(CONTRAST ONLY)(CPT=74177)    Result Date: 10/18/2024  CONCLUSION:  Mild bladder wall thickening likely secondary to incomplete distension but correlation with urinalysis is recommended.   LOCATION:  [facility]   Dictated by (CST): Berna Castillo MD on 10/18/2024 at 9:40 PM     Finalized by (CST): Berna Castillo MD on 10/18/2024 at 9:43 PM       US PELVIS W DOPPLER (XJM=[id number])    Result Date: 10/11/2024  CONCLUSION:  Trace free fluid in the cul de sac.  Small amount of free fluid in the endometrial cavity.  No evidence of an adnexal mass or ovarian torsion.   LOCATION:  [facility]    Dictated by (CST): Shama Hartley MD on 10/11/2024 at 7:31 AM     Finalized by (CST): Shama Hartley MD on 10/11/2024 at 7:56 AM       CT ABDOMEN+PELVIS(CONTRAST ONLY)(CPT=74177)    Result Date: 10/10/2024  CONCLUSION:  1. There is no specific evidence of an acute abnormality in the abdomen or pelvis. 2. Small amount of free fluid in pelvis and corpus luteum cyst left ovary are physiologic findings.   LOCATION:  [facility]   Dictated by (CST): Dao Daley MD on 10/10/2024 at 11:13 PM     Finalized by (CST): Dao Daley MD on 10/10/2024 at 11:15 PM          Labs:  Recent Labs   Lab 10/18/24  2027 10/19/24  0545   RBC 3.68* 3.26*   HGB 12.5 10.9*   HCT 35.9 31.3*   MCV 97.6 96.0   MCH 34.0 33.4   MCHC 34.8 34.8   RDW 12.5 12.3   NEPRELIM 4.34 2.43   WBC 8.0 6.6   .0 224.0         Recent Labs   Lab 10/18/24  2027 10/19/24  0545   * 97   BUN 7* 5*    CREATSERUM 0.75 0.64   EGFRCR 108 120   CA 8.8 8.9    139   K 3.9 4.0    108   CO2 28.0 25.0       Pre-morbid m RS 0      Assessment and Plan:    A 34 years old female with:     Seizure-like episodes, in a setting of seizure disorder history, on Oxcarbazepine at home. Suspicious for non-epileptic event in a setting of abdomina pain as a potential trigger. Still with severe abdominal pain. Short episode of body stiffening this morning in a setting of sever abdominal pain.   Continue home dose of Oxcarbazepine for now, discussed with dr. Zarate, patient's epileptologist  Given GI issues and reported missed doses, will check Oxcarbazepine level pending  Lamotrigine 25 mg daily x 3 days started 10/20 for both anxiety/mood as well as seizure prevention, increase to 50 mg daily on day 4 (10/23). No Topiramate due to kidney stones   Extended EEG completed 10/21 - per preliminary reading by dr. Torres, \"no seizures, no IEDs. Excessive beta frequencies\".  MRI brain - negative for acute pathology  Seizure precautions  Anxiety - continue home meds. May benefit from Psychotherapy as outpatient  Abdominal pain - being investigated by GI and Medicine. Stable to undergo further investigations with EGD/colonoscopy per Neurology point of view. Do not hold antiepileptics.  Discussed with patient, all questions answered. Discussed with dr. Crowe.   No further active inpatient neurological work up indicated at this time. Patient is to follow up with Neurology, dr. Zarate on 12/9 at 2:30 pm.     Is this a shared or split note between Advanced Practice Provider and Physician? No       Alejandra Nuñez [facility]  10/22/2024, 9:28 AM   ITeam # [id number]

## 2024-10-22 NOTE — PROGRESS NOTES
[facility] Gastroenterology Progress Note    CC: abd pain   S: Pt continues to have abd pain   O: /66 (BP Location: Right arm)   Pulse 65   Temp 98 °F (36.7 °C) (Oral)   Resp 18   Ht 5' 4\" (1.626 m)   Wt 124 lb (56.2 kg)   LMP 09/27/2024 (Exact Date)   SpO2 94%   BMI 21.28 kg/m²     Gen: NAD  Abd: (+)BS, soft, mildly tender to palpation diffusely non-distended; no rebound or guarding    Laboratory/Imaging Data:       Recent Labs   Lab 10/19/24  2347 10/20/24  1358   PGLU 100* 139*     No results for input(s): \"INR\" in the last 168 hours.      Recent Labs   Lab 10/18/24  2027 10/19/24  0545   WBC 8.0 6.6   HGB 12.5 10.9*   .0 224.0       Recent Labs   Lab 10/18/24  2027 10/19/24  0545    139   K 3.9 4.0    108   CO2 28.0 25.0   BUN 7* 5*   CREATSERUM 0.75 0.64       Recent Labs   Lab 10/18/24  2027   ALT 22   AST 11*       Assessment/Plan: 34-year-old female presenting for evaluation of abdominal pain.  Patient admitted to hospital for concern for seizure versus pseudoseizure. Stool studies unremarkable.   -CT abd with no GI abnormality; Ultrasound abdomen with no gallbladder wall thickening; HIDA not consistent with cholecystitis   -Urology notes reviewed; okay to proceed with EGD and colonoscopy from their point of view; will plan on EGD and colonoscopy in a.m.   -Clinical diet; GoLytely prep; n.p.o. at midnight for EGD and colonoscopy in a.m.  -Appreciate neurology recommendations and continued workup  -Continue pantoprazole 40 mg once a day    Ian Banks MD, 10/22/24, 3:07 PM  [facility] Gastroenterology

## 2024-10-22 NOTE — PROGRESS NOTES
[facility]   part of [facility]     Hospitalist Progress Note     Cecily LEMUS Sun Patient Status:  Inpatient    1990 MRN [id number]   Location [facility] 3NE-A Attending Vandana Coto MD   Hosp Day # 3 PCP None Pcp     Chief Complaint:abd pain     Subjective:     Patient reports abd pain ongoing, no N/V/melena or hematochezia.     Objective:    Review of Systems:   A comprehensive review of systems was completed; pertinent positive and negatives stated in subjective.    Vital signs:  Temp:  [98 °F (36.7 °C)-98.6 °F (37 °C)] 98 °F (36.7 °C)  Pulse:  [62-79] 65  Resp:  [18] 18  BP: (104-124)/(66-91) 104/66  SpO2:  [94 %-98 %] 94 %    Physical Exam:    General: No acute distress  Respiratory: No wheezes, no rhonchi  Cardiovascular: S1, S2, regular rate and rhythm  Abdomen: Soft, Non-tender, non-distended, positive bowel sounds  Neuro: No new focal deficits.   Extremities: No edema      Diagnostic Data:    Labs:  Recent Labs   Lab 10/18/24  2027 10/19/24  0545   WBC 8.0 6.6   HGB 12.5 10.9*   MCV 97.6 96.0   .0 224.0       Recent Labs   Lab 10/18/24  2027 10/19/24  0545   * 97   BUN 7* 5*   CREATSERUM 0.75 0.64   CA 8.8 8.9   ALB 3.8  --     139   K 3.9 4.0    108   CO2 28.0 25.0   ALKPHO 53  --    AST 11*  --    ALT 22  --    BILT 0.3  --    TP 7.3  --        Estimated Creatinine Clearance: 109 mL/min (based on SCr of 0.64 mg/dL).    No results for input(s): \"TROP\", \"TROPHS\", \"CK\" in the last 168 hours.    No results for input(s): \"PTP\", \"INR\" in the last 168 hours.       Microbiology    No results found for this visit on 10/18/24.      Imaging: Reviewed in Epic.    Medications:    pantoprazole  40 mg Intravenous Q24H    [START ON 10/23/2024] lamoTRIgine  50 mg Oral Daily    lamoTRIgine  25 mg Oral Daily    escitalopram  20 mg Oral Daily    OXcarbazepine  600 mg Oral BID    enoxaparin  40 mg Subcutaneous Daily       Assessment & Plan:      #Abdominal pain, unclear  etiology. ? Functional, endometriosis    - US abd neg for acute pathology, HIDA neg   - scopes pending   - stool study neg, Cdiff neg   - pain control   - soft diet   - CT AP noted   - needs to see Gyne as outpatient post dc     # diarrhea   - as above   - imodium      # chronic pain due to underlying endometriosis   # depression ,lexapro  # seizure vs pseudoseizure   -  trileptal level 13  - lamotrigine added   - EEG neg for active seizure   - MRI brain neg   - neuro rec appreciated      # hx of migraine     DC planning once cleared per GI with close OP follow up.     Vandana Coto MD    Supplementary Documentation:     Quality:  DVT Mechanical Prophylaxis:   SCDs,    DVT Pharmacologic Prophylaxis   Medication    enoxaparin (Lovenox) 40 MG/0.4ML SUBQ injection 40 mg                Code Status: Not on file  Sim: No urinary catheter in place  Sim Duration (in days):   Central line:    SONJA:     Discharge is dependent on: course  At this point Ms. Garsia is expected to be discharge to: home     The 21st Century Cures Act makes medical notes like these available to patients in the interest of transparency. Please be advised this is a medical document. Medical documents are intended to carry relevant information, facts as evident, and the clinical opinion of the practitioner. The medical note is intended as peer to peer communication and may appear blunt or direct. It is written in medical language and may contain abbreviations or verbiage that are unfamiliar.

## 2024-10-22 NOTE — PLAN OF CARE
Assumed care at 0700. A&O x 4. On RA tolerating well. NSR on tele. Patient had a witness seizure event by PCT estimated about 5 seconds resolved on its own. PRN pain medication given per MAR. Bowel prep started per MAR. EGD and colonoscopy tomorrow. NPO after prep. Consent signed on the chart. Please see epic flowsheet for more detail.         Problem: GASTROINTESTINAL - ADULT  Goal: Minimal or absence of nausea and vomiting  Description: INTERVENTIONS:  - Maintain adequate hydration with IV or PO as ordered and tolerated  - Nasogastric tube to low intermittent suction as ordered  - Evaluate effectiveness of ordered antiemetic medications  - Provide nonpharmacologic comfort measures as appropriate  - Advance diet as tolerated, if ordered  - Obtain nutritional consult as needed  - Evaluate fluid balance  Outcome: Progressing  Goal: Maintains or returns to baseline bowel function  Description: INTERVENTIONS:  - Assess bowel function  - Maintain adequate hydration with IV or PO as ordered and tolerated  - Evaluate effectiveness of GI medications  - Encourage mobilization and activity  - Obtain nutritional consult as needed  - Establish a toileting routine/schedule  - Consider collaborating with pharmacy to review patient's medication profile  Outcome: Progressing     Problem: METABOLIC/FLUID AND ELECTROLYTES - ADULT  Goal: Electrolytes maintained within normal limits  Description: INTERVENTIONS:  - Monitor labs and rhythm and assess patient for signs and symptoms of electrolyte imbalances  - Administer electrolyte replacement as ordered  - Monitor response to electrolyte replacements, including rhythm and repeat lab results as appropriate  - Fluid restriction as ordered  - Instruct patient on fluid and nutrition restrictions as appropriate  Outcome: Progressing       Problem: PAIN - ADULT  Goal: Verbalizes/displays adequate comfort level or patient's stated pain goal  Description: INTERVENTIONS:  - Encourage pt to  monitor pain and request assistance  - Assess pain using appropriate pain scale  - Administer analgesics based on type and severity of pain and evaluate response  - Implement non-pharmacological measures as appropriate and evaluate response  - Consider cultural and social influences on pain and pain management  - Manage/alleviate anxiety  - Utilize distraction and/or relaxation techniques  - Monitor for opioid side effects  - Notify MD/LIP if interventions unsuccessful or patient reports new pain  - Anticipate increased pain with activity and pre-medicate as appropriate  Outcome: Progressing

## 2024-10-23 ENCOUNTER — ANESTHESIA (OUTPATIENT)
Dept: ENDOSCOPY | Facility: HOSPITAL | Age: 34
End: 2024-10-23
Payer: MEDICAID

## 2024-10-23 VITALS
BODY MASS INDEX: 21.17 KG/M2 | HEART RATE: 53 BPM | DIASTOLIC BLOOD PRESSURE: 78 MMHG | RESPIRATION RATE: 15 BRPM | TEMPERATURE: 98 F | SYSTOLIC BLOOD PRESSURE: 142 MMHG | OXYGEN SATURATION: 98 % | WEIGHT: 124 LBS | HEIGHT: 64 IN

## 2024-10-23 LAB
ANION GAP SERPL CALC-SCNC: 4 MMOL/L (ref 0–18)
BASOPHILS # BLD AUTO: 0.03 X10(3) UL (ref 0–0.2)
BASOPHILS NFR BLD AUTO: 0.5 %
BUN BLD-MCNC: <5 MG/DL (ref 9–23)
CALCIUM BLD-MCNC: 9.1 MG/DL (ref 8.7–10.4)
CHLORIDE SERPL-SCNC: 103 MMOL/L (ref 98–112)
CO2 SERPL-SCNC: 31 MMOL/L (ref 21–32)
CREAT BLD-MCNC: 0.65 MG/DL
EGFRCR SERPLBLD CKD-EPI 2021: 120 ML/MIN/1.73M2 (ref 60–?)
EOSINOPHIL # BLD AUTO: 0.28 X10(3) UL (ref 0–0.7)
EOSINOPHIL NFR BLD AUTO: 4.7 %
ERYTHROCYTE [DISTWIDTH] IN BLOOD BY AUTOMATED COUNT: 12.3 %
GLUCOSE BLD-MCNC: 88 MG/DL (ref 70–99)
HCT VFR BLD AUTO: 32.1 %
HGB BLD-MCNC: 11.3 G/DL
IMM GRANULOCYTES # BLD AUTO: 0.01 X10(3) UL (ref 0–1)
IMM GRANULOCYTES NFR BLD: 0.2 %
LYMPHOCYTES # BLD AUTO: 2.69 X10(3) UL (ref 1–4)
LYMPHOCYTES NFR BLD AUTO: 44.8 %
MAGNESIUM SERPL-MCNC: 1.9 MG/DL (ref 1.6–2.6)
MCH RBC QN AUTO: 33 PG (ref 26–34)
MCHC RBC AUTO-ENTMCNC: 35.2 G/DL (ref 31–37)
MCV RBC AUTO: 93.9 FL
MONOCYTES # BLD AUTO: 0.42 X10(3) UL (ref 0.1–1)
MONOCYTES NFR BLD AUTO: 7 %
NEUTROPHILS # BLD AUTO: 2.57 X10 (3) UL (ref 1.5–7.7)
NEUTROPHILS # BLD AUTO: 2.57 X10(3) UL (ref 1.5–7.7)
NEUTROPHILS NFR BLD AUTO: 42.8 %
PLATELET # BLD AUTO: 215 10(3)UL (ref 150–450)
POTASSIUM SERPL-SCNC: 3.8 MMOL/L (ref 3.5–5.1)
RBC # BLD AUTO: 3.42 X10(6)UL
SODIUM SERPL-SCNC: 138 MMOL/L (ref 136–145)
WBC # BLD AUTO: 6 X10(3) UL (ref 4–11)

## 2024-10-23 PROCEDURE — 0DB68ZX EXCISION OF STOMACH, VIA NATURAL OR ARTIFICIAL OPENING ENDOSCOPIC, DIAGNOSTIC: ICD-10-PCS | Performed by: INTERNAL MEDICINE

## 2024-10-23 PROCEDURE — 0DB48ZX EXCISION OF ESOPHAGOGASTRIC JUNCTION, VIA NATURAL OR ARTIFICIAL OPENING ENDOSCOPIC, DIAGNOSTIC: ICD-10-PCS | Performed by: INTERNAL MEDICINE

## 2024-10-23 PROCEDURE — 99239 HOSP IP/OBS DSCHRG MGMT >30: CPT | Performed by: HOSPITALIST

## 2024-10-23 PROCEDURE — 0DBE8ZX EXCISION OF LARGE INTESTINE, VIA NATURAL OR ARTIFICIAL OPENING ENDOSCOPIC, DIAGNOSTIC: ICD-10-PCS | Performed by: INTERNAL MEDICINE

## 2024-10-23 PROCEDURE — 0DB98ZX EXCISION OF DUODENUM, VIA NATURAL OR ARTIFICIAL OPENING ENDOSCOPIC, DIAGNOSTIC: ICD-10-PCS | Performed by: INTERNAL MEDICINE

## 2024-10-23 RX ORDER — PANTOPRAZOLE SODIUM 40 MG/1
40 TABLET, DELAYED RELEASE ORAL
Status: DISCONTINUED | OUTPATIENT
Start: 2024-10-23 | End: 2024-10-23

## 2024-10-23 RX ORDER — HYDROCODONE BITARTRATE AND ACETAMINOPHEN 10; 325 MG/1; MG/1
1 TABLET ORAL EVERY 8 HOURS PRN
Qty: 30 TABLET | Refills: 0 | Status: SHIPPED | OUTPATIENT
Start: 2024-10-23

## 2024-10-23 RX ORDER — PANTOPRAZOLE SODIUM 40 MG/1
40 TABLET, DELAYED RELEASE ORAL
Qty: 60 TABLET | Refills: 0 | Status: SHIPPED | OUTPATIENT
Start: 2024-10-23

## 2024-10-23 RX ADMIN — SODIUM CHLORIDE: 9 INJECTION, SOLUTION INTRAVENOUS at 13:36:00

## 2024-10-23 NOTE — PROGRESS NOTES
[facility]   part of [facility]     Hospitalist Progress Note     Cecily LEMUS Sun Patient Status:  Inpatient    1990 MRN [id number]   Location [facility] 3NE-A Attending Vandana Coto MD   Hosp Day # 4 PCP None Pcp     Chief Complaint:abd pain     Subjective:     Patient reports abd pain ongoing, no N/V/melena or hematochezia.     Objective:    Review of Systems:   A comprehensive review of systems was completed; pertinent positive and negatives stated in subjective.    Vital signs:  Temp:  [97.5 °F (36.4 °C)-98 °F (36.7 °C)] 97.5 °F (36.4 °C)  Pulse:  [47-69] 59  Resp:  [16-20] 16  BP: (106-146)/() 108/69  SpO2:  [97 %] 97 %    Physical Exam:    General: No acute distress  Respiratory: No wheezes, no rhonchi  Cardiovascular: S1, S2, regular rate and rhythm  Abdomen: Soft, Non-tender, non-distended, positive bowel sounds  Neuro: No new focal deficits.   Extremities: No edema      Diagnostic Data:    Labs:  Recent Labs   Lab 10/18/24  2027 10/19/24  0545 10/23/24  0656   WBC 8.0 6.6 6.0   HGB 12.5 10.9* 11.3*   MCV 97.6 96.0 93.9   .0 224.0 215.0       Recent Labs   Lab 10/18/24  2027 10/19/24  0545 10/23/24  0656   * 97 88   BUN 7* 5* <5*   CREATSERUM 0.75 0.64 0.65   CA 8.8 8.9 9.1   ALB 3.8  --   --     139 138   K 3.9 4.0 3.8    108 103   CO2 28.0 25.0 31.0   ALKPHO 53  --   --    AST 11*  --   --    ALT 22  --   --    BILT 0.3  --   --    TP 7.3  --   --        Estimated Creatinine Clearance: 107.3 mL/min (based on SCr of 0.65 mg/dL).    No results for input(s): \"TROP\", \"TROPHS\", \"CK\" in the last 168 hours.    No results for input(s): \"PTP\", \"INR\" in the last 168 hours.       Microbiology    No results found for this visit on 10/18/24.      Imaging: Reviewed in Epic.    Medications:    pantoprazole  40 mg Intravenous Q24H    lamoTRIgine  50 mg Oral Daily    escitalopram  20 mg Oral Daily    OXcarbazepine  600 mg Oral BID    enoxaparin  40 mg  Subcutaneous Daily       Assessment & Plan:      #Abdominal pain, unclear etiology. ? Functional, endometriosis    - US abd neg for acute pathology, HIDA neg   - scopes pending   - stool study neg, Cdiff neg   - pain control   - soft diet   - CT AP noted   - needs to see Gyne as outpatient post dc     # diarrhea   - as above   - imodium      # chronic pain due to underlying endometriosis   # depression ,lexapro  # seizure vs pseudoseizure   -  trileptal level 13  - lamotrigine added   - EEG neg for active seizure   - MRI brain neg   - neuro rec appreciated      # hx of migraine     DC planning once cleared per GI with close OP follow up.     Kizzy De Los Santos M.D.  [facility]      Supplementary Documentation:     Quality:  DVT Mechanical Prophylaxis:   SCDs,    DVT Pharmacologic Prophylaxis   Medication    enoxaparin (Lovenox) 40 MG/0.4ML SUBQ injection 40 mg                Code Status: Not on file  Sim: No urinary catheter in place  Sim Duration (in days):   Central line:    SONJA:     Discharge is dependent on: course  At this point Ms. Garsia is expected to be discharge to: home     The 21st Century Cures Act makes medical notes like these available to patients in the interest of transparency. Please be advised this is a medical document. Medical documents are intended to carry relevant information, facts as evident, and the clinical opinion of the practitioner. The medical note is intended as peer to peer communication and may appear blunt or direct. It is written in medical language and may contain abbreviations or verbiage that are unfamiliar.

## 2024-10-23 NOTE — OPERATIVE REPORT
Operative Report-Esophagogastroduodenoscopy      PREOPERATIVE DIAGNOSIS/INDICATION: Abdominal pain, nausea, vomiting     POSTOPERTATIVE DIAGNOSIS: Irregular Z-line, hiatal hernia, gastric ulcers, duodenal ulcers    PROCEDURE PERFORMED: EGD    INFORMED CONSENT: Once a brief history and physical examination was performed, the risks, benefits and alternatives to the procedure were discussed with the patient and/or family and informed consent was obtained.  The risks of sedation, perforation, missed lesions and need for surgery were all discussed.  Patient expressed understanding of the risks and agreed to proceed.      PROCEDURE DESCRIPTION:  The patient was then brought to the endoscopy suite where his/her pulse, pulse oximetry and blood pressure were monitored. He/she was placed in the left lateral decubitus position and deep sedation was administered. Once adequate sedation was achieved, a bite block was placed and a lubricated tip of an Olympus video upper endoscope was inserted through the oropharynx and gently manipulated through the esophagus into the stomach and the distal duodenum. Upon withdrawal of the endoscope, careful visualization of the mucosa was performed.     FINDINGS:    ESOPHAGUS: Z line irregular at 35 cm.  Z-line biopsies taken with cold forceps for histology.  Otherwise normal esophagus.  STOMACH: Multiple clean-based antral ulcers in various stages of healing.  Random gastric biopsies taken with cold forceps for histology.  3 cm hiatal hernia seen on retroflexion.  DUODENUM: Small clean-based duodenal ulcers in bulb.  With no active bleeding.  Biopsies taken with cold forceps from the duodenal bulb and second portion of the duodenum.    THERAPEUTICS: Biopsies were performed.    RECOMMENDATIONS:   Post EGD precautions, watch for bleeding, infection, perforation, adverse drug reactions   Follow biopsies.  Pantoprazole 40 mg twice daily  Repeat EGD in 8 weeks.    Ian  MD Jocelyn  10/23/2024  1:44 PM

## 2024-10-23 NOTE — DISCHARGE PLANNING
NURSING DISCHARGE NOTE    Discharged Home via Ambulatory.  Accompanied by Family member  Belongings Taken by patient/family.      IV and tele removed.  Discharge paper work reviewed with patient and family member.

## 2024-10-23 NOTE — OPERATIVE REPORT
Operative Report-Colonoscopy    PREOPERATIVE DIAGNOSIS/INDICATION: Abdominal pain, nausea, vomiting, irregular bowel habits    POSTOPERTATIVE DIAGNOSIS: Poor preparation, internal hemorrhoids    PROCEDURE PERFORMED: COLONOSCOPY    INFORMED CONSENT:  Once a brief history and physical examination was performed, the risks, benefits and alternatives to the procedure were discussed with the patient and/or family and informed consent was obtained. The risks of sedation, perforation, missed lesions and need for surgery were all discussed.  Patient expressed understanding of the risks and agreed to proceed.      PROCEDURE DESCRIPTION:The patient was then brought to the endoscopy suite where his pulse, pulse oximetry and blood pressure were monitored. Patient was placed in the left lateral decubitus position and deep sedation was administered. Once adequate sedation was achieved, a rectal exam was performed which was normal. A lubricated tip of an Olympus video colonoscope was then inserted through the rectum and gently manipulated under direct visualization to the cecal pole and the terminal ileum. The quality of the preparation was poor. Upon withdrawal of the colonoscope, careful visualization of the mucosa was performed.     [place] Prep Score: Right Colon 1 Transverse colon 2 Left colon 1    FINDINGS/THERAPEUTICS:    TERMINAL ILEUM: Normal terminal ileum   COLON: Poor preparation with extensive copious brown liquid stool seen which was lavaged extensively.  No large masses or bleeding lesions seen on this examination.  Random gastric biopsies taken with cold forceps for histology.  No colitis or erythema seen of the examined colonic mucosa.  RECTUM: Internal hemorrhoids found on retroflexion     RECOMMENDATIONS:   Post Colonoscopy/polypectomy precautions, watch for bleeding, infection, perforation, adverse drug reactions   Follow biopsies.  Can consider repeat colonoscopy  as outpatient with extended preparation  Advance diet as tolerated  OV in 4 weeks  GI will sign off at this time. Please call back with any questions or concerns.       Ian Banks MD  10/23/2024  2:01 PM

## 2024-10-23 NOTE — PLAN OF CARE
Assumed patient care at 0730. Alert and oriented x4. Room air. Normal sinus on tele. NPO for procedure. 0.9% normal saline infusing at 100ml/hr. Preparing for EGD/Colonoscopy @1200. Seizure precautions are in place and will continue to follow the care plan.       Problem: GASTROINTESTINAL - ADULT  Goal: Minimal or absence of nausea and vomiting  Description: INTERVENTIONS:  - Maintain adequate hydration with IV or PO as ordered and tolerated  - Nasogastric tube to low intermittent suction as ordered  - Evaluate effectiveness of ordered antiemetic medications  - Provide nonpharmacologic comfort measures as appropriate  - Advance diet as tolerated, if ordered  - Obtain nutritional consult as needed  - Evaluate fluid balance  Outcome: Progressing  Goal: Maintains or returns to baseline bowel function  Description: INTERVENTIONS:  - Assess bowel function  - Maintain adequate hydration with IV or PO as ordered and tolerated  - Evaluate effectiveness of GI medications  - Encourage mobilization and activity  - Obtain nutritional consult as needed  - Establish a toileting routine/schedule  - Consider collaborating with pharmacy to review patient's medication profile  Outcome: Progressing     Problem: METABOLIC/FLUID AND ELECTROLYTES - ADULT  Goal: Electrolytes maintained within normal limits  Description: INTERVENTIONS:  - Monitor labs and rhythm and assess patient for signs and symptoms of electrolyte imbalances  - Administer electrolyte replacement as ordered  - Monitor response to electrolyte replacements, including rhythm and repeat lab results as appropriate  - Fluid restriction as ordered  - Instruct patient on fluid and nutrition restrictions as appropriate  Outcome: Progressing     Problem: PAIN - ADULT  Goal: Verbalizes/displays adequate comfort level or patient's stated pain goal  Description: INTERVENTIONS:  - Encourage pt to monitor pain and request assistance  - Assess pain using appropriate pain scale  -  Administer analgesics based on type and severity of pain and evaluate response  - Implement non-pharmacological measures as appropriate and evaluate response  - Consider cultural and social influences on pain and pain management  - Manage/alleviate anxiety  - Utilize distraction and/or relaxation techniques  - Monitor for opioid side effects  - Notify MD/LIP if interventions unsuccessful or patient reports new pain  - Anticipate increased pain with activity and pre-medicate as appropriate  Outcome: Progressing

## 2024-10-23 NOTE — PLAN OF CARE
Assumed care of pt 1930  A&Ox4, on RA, NSR on tele  Pt reported nausea, 1 occurrence of vomit witnessed by writer  Administer prn medication for nausea per MAR  Pt reports pain, prn medication given per MAR  Pt denies SOB  NPO at midnight/after prep for EGD and colonoscopy tomorrow  Consent signed, currently taking prep per MAR  Safety and seizure precautions maintained  All needs met at this time     Problem: GASTROINTESTINAL - ADULT  Goal: Minimal or absence of nausea and vomiting  Description: INTERVENTIONS:  - Maintain adequate hydration with IV or PO as ordered and tolerated  - Nasogastric tube to low intermittent suction as ordered  - Evaluate effectiveness of ordered antiemetic medications  - Provide nonpharmacologic comfort measures as appropriate  - Advance diet as tolerated, if ordered  - Obtain nutritional consult as needed  - Evaluate fluid balance  Outcome: Progressing  Goal: Maintains or returns to baseline bowel function  Description: INTERVENTIONS:  - Assess bowel function  - Maintain adequate hydration with IV or PO as ordered and tolerated  - Evaluate effectiveness of GI medications  - Encourage mobilization and activity  - Obtain nutritional consult as needed  - Establish a toileting routine/schedule  - Consider collaborating with pharmacy to review patient's medication profile  Outcome: Progressing     Problem: METABOLIC/FLUID AND ELECTROLYTES - ADULT  Goal: Electrolytes maintained within normal limits  Description: INTERVENTIONS:  - Monitor labs and rhythm and assess patient for signs and symptoms of electrolyte imbalances  - Administer electrolyte replacement as ordered  - Monitor response to electrolyte replacements, including rhythm and repeat lab results as appropriate  - Fluid restriction as ordered  - Instruct patient on fluid and nutrition restrictions as appropriate  Outcome: Progressing    Problem: PAIN - ADULT  Goal: Verbalizes/displays adequate comfort level or patient's stated pain  goal  Description: INTERVENTIONS:  - Encourage pt to monitor pain and request assistance  - Assess pain using appropriate pain scale  - Administer analgesics based on type and severity of pain and evaluate response  - Implement non-pharmacological measures as appropriate and evaluate response  - Consider cultural and social influences on pain and pain management  - Manage/alleviate anxiety  - Utilize distraction and/or relaxation techniques  - Monitor for opioid side effects  - Notify MD/LIP if interventions unsuccessful or patient reports new pain  - Anticipate increased pain with activity and pre-medicate as appropriate  Outcome: Progressing

## 2024-10-23 NOTE — ANESTHESIA POSTPROCEDURE EVALUATION
[facility]    Cecily Garsia Patient Status:  Inpatient   Age/Gender 34 year old female MRN [id number]   Location [facility] Attending Kizzy De Los Santos MD   Hosp Day # 4 PCP None Pcp       Anesthesia Post-op Note    ESOPHAGOGASTRODUODENOSCOPY (EGD) with biopsies , COLONOSCOPY with biopsies    Procedure Summary       Date: 10/23/24 Room / Location:  ENDOSCOPY 02 / [facility]    Anesthesia Start: 1329 Anesthesia Stop: 1405    Procedures:       ESOPHAGOGASTRODUODENOSCOPY (EGD) with biopsies , COLONOSCOPY with biopsies      COLONOSCOPY Diagnosis: (gastric ulcer, duodenal ulcer, hiatal hernia    colon: poor prep, hemorrhoids)    Surgeons: Ian Banks MD Anesthesiologist: Emmanuel Coughlin MD    Anesthesia Type: MAC ASA Status: 2            Anesthesia Type: MAC    Vitals Value Taken Time   BP 99/68 10/23/24 1408   Temp 98 10/23/24 1410   Pulse 50 10/23/24 1410   Resp 23 10/23/24 1410   SpO2 99 % 10/23/24 1410   Vitals shown include unfiled device data.    Patient Location: Endoscopy    Anesthesia Type: MAC    Airway Patency: patent    Postop Pain Control: adequate    Mental Status: preanesthetic baseline    Nausea/Vomiting: none    Cardiopulmonary/Hydration status: stable euvolemic    Postop vital signs: stable    Dental Exam: Unchanged from Preop    Patient to be discharged home when criteria met.

## 2024-10-23 NOTE — ANESTHESIA PREPROCEDURE EVALUATION
PRE-OP EVALUATION         Patient Name: Cecily Garsia    Admit Diagnosis: Abdominal pain, acute [R10.9]    Pre-op Diagnosis: INPT    ESOPHAGOGASTRODUODENOSCOPY (EGD), COLONOSCOPY    Anesthesia Procedure: ESOPHAGOGASTRODUODENOSCOPY (EGD), COLONOSCOPY  COLONOSCOPY    Surgeons and Role:     * Ian Banks MD - Primary    Pre-op vitals reviewed.  Temp: 97.5 °F (36.4 °C)  Pulse: 59  Resp: 16  BP: 108/69  SpO2: 97 %  Body mass index is 21.28 kg/m².    Current medications reviewed.  Hospital Medications:   [COMPLETED] polyethylene glycol-electrolyte (Golytely) 236 g oral solution 2,000 mL  2,000 mL Oral BID    pantoprazole (Protonix) 40 mg in sodium chloride 0.9% PF 10 mL IV push  40 mg Intravenous Q24H    lamoTRIgine (LaMICtal) tab 50 mg  50 mg Oral Daily    [COMPLETED] morphINE PF 2 MG/ML injection 2 mg  2 mg Intravenous Once    LORazepam (Ativan) 2 mg/mL injection 1 mg  1 mg Intravenous Q2H PRN    loperamide (Imodium) cap 2 mg  2 mg Oral QID PRN    [COMPLETED] LORazepam (Ativan) 2 mg/mL injection 0.5 mg  0.5 mg Intravenous Once    [COMPLETED] lamoTRIgine (LaMICtal) tab 25 mg  25 mg Oral Daily    [] HYDROmorphone (Dilaudid) 1 MG/ML injection 0.5 mg  0.5 mg Intravenous Q30 Min PRN    [] ondansetron (Zofran) 4 MG/2ML injection 4 mg  4 mg Intravenous Q4H PRN    butalbital-acetaminophen-caffeine (Fioricet) -40 MG per tab 1 tablet  1 tablet Oral Q4H PRN    escitalopram (Lexapro) tab 20 mg  20 mg Oral Daily    OXcarbazepine (Trileptal) tab 600 mg  600 mg Oral BID    melatonin tab 3 mg  3 mg Oral Nightly PRN    sodium chloride 0.9% infusion   Intravenous Continuous    enoxaparin (Lovenox) 40 MG/0.4ML SUBQ injection 40 mg  40 mg Subcutaneous Daily    acetaminophen (Tylenol Extra Strength) tab 500 mg  500 mg Oral Q4H PRN    ondansetron (Zofran) 4 MG/2ML injection 4 mg  4 mg Intravenous Q6H PRN    prochlorperazine (Compazine) 10 MG/2ML injection 5 mg  5 mg Intravenous Q8H PRN    polyethylene  glycol (PEG 3350) (Miralax) 17 g oral packet 17 g  17 g Oral Daily PRN    [] ketorolac (Toradol) 15 MG/ML injection 15 mg  15 mg Intravenous Q6H PRN    Or    [] ketorolac (Toradol) 30 MG/ML injection 30 mg  30 mg Intravenous Q6H PRN    HYDROmorphone (Dilaudid) 1 MG/ML injection 0.2 mg  0.2 mg Intravenous Q2H PRN    Or    HYDROmorphone (Dilaudid) 1 MG/ML injection 0.4 mg  0.4 mg Intravenous Q2H PRN    Or    HYDROmorphone (Dilaudid) 1 MG/ML injection 0.8 mg  0.8 mg Intravenous Q2H PRN    HYDROcodone-acetaminophen (Norco)  MG per tab 1 tablet  1 tablet Oral Q4H PRN    [COMPLETED] ketorolac (Toradol) 15 MG/ML injection 15 mg  15 mg Intravenous Once    [COMPLETED] HYDROmorphone (Dilaudid) 1 MG/ML injection 0.5 mg  0.5 mg Intravenous Once    [COMPLETED] ondansetron (Zofran) 4 MG/2ML injection 4 mg  4 mg Intravenous Once    [COMPLETED] sodium chloride 0.9 % IV bolus 1,000 mL  1,000 mL Intravenous Once    [COMPLETED] iopamidol 76% (ISOVUE-370) injection for power injector  65 mL Intravenous ONCE PRN    [COMPLETED] HYDROmorphone (Dilaudid) 1 MG/ML injection 1 mg  1 mg Intravenous Once    [COMPLETED] dicyclomine (Bentyl) 10 MG/ML injection 20 mg  20 mg Intramuscular Once       Outpatient Medications:   Prescriptions Prior to Admission[1]    Allergies: Patient has no known allergies.      Anesthesia Evaluation    Patient summary reviewed.    Anesthetic Complications           GI/Hepatic/Renal  Comment: abdominal pain           (-) chronic renal disease   (-) liver disease                 Cardiovascular    Negative cardiovascular ROS.        MET: >4      (-) hypertension     (-) CAD  (-) past MI  (-) CABG/stent  (-) pacemaker/AICD  (-) valvular problems/murmurs     (-) dysrhythmias   (-) CHF  (-) angina              Endo/Other    Negative endo/other ROS.  (-) diabetes                            Pulmonary    Negative pulmonary ROS.  (-) asthma  (-) COPD                   Neuro/Psych          (-) CVA     (+)  seizures (couple days)                       Past Surgical History:   Procedure Laterality Date    Laparoscopic assisted  01/10/2014    varicose vein on ovaries, minimal endometriotic lesions cul de sac, normal ovaries/tubes    Other  01/10/2014    laproscopic endometrial     Social History     Socioeconomic History    Marital status: Single   Tobacco Use    Smoking status: Former     Current packs/day: 0.00     Types: Cigarettes     Quit date: 2020     Years since quittin.7     Passive exposure: Past    Smokeless tobacco: Never   Vaping Use    Vaping status: Some Days    Substances: Nicotine   Substance and Sexual Activity    Alcohol use: No     Alcohol/week: 0.0 standard drinks of alcohol    Drug use: No    Sexual activity: Yes     Partners: Male   Other Topics Concern    Caffeine Concern Yes    Exercise Yes     History   Drug Use No     Available pre-op labs reviewed.  Lab Results   Component Value Date    WBC 6.0 10/23/2024    RBC 3.42 (L) 10/23/2024    HGB 11.3 (L) 10/23/2024    HCT 32.1 (L) 10/23/2024    MCV 93.9 10/23/2024    MCH 33.0 10/23/2024    MCHC 35.2 10/23/2024    RDW 12.3 10/23/2024    .0 10/23/2024     Lab Results   Component Value Date     10/23/2024    K 3.8 10/23/2024     10/23/2024    CO2 31.0 10/23/2024    BUN <5 (L) 10/23/2024    CREATSERUM 0.65 10/23/2024    GLU 88 10/23/2024    CA 9.1 10/23/2024            Airway      Mallampati: II  Mouth opening: 3 FB  TM distance: 4 - 6 cm  Neck ROM: full Cardiovascular    Cardiovascular exam normal.         Dental             Pulmonary    Pulmonary exam normal.                 Other findings              ASA: 2   Plan: MAC  NPO status verified and         Comment:   Plan is MAC anesthesia, which likely will include deep sedation.  Implied that memory of procedure is unlikely although intraop recall, if it occurs, may be a reasonable and comfortable experience with this anesthetic.  Aware that general anesthesia is not intended  though deep sedation may include brief moments of general anesthesia.   Questions answered. Accepts. The consent was signed without further questions.   Plan/risks discussed with: patient                Present on Admission:   Abdominal pain, acute   Bipolar 2 disorder (HCC)   Seizure (HCC)   Diarrhea of presumed infectious origin   Moderate episode of recurrent major depressive disorder (HCC)             [1]   Medications Prior to Admission   Medication Sig Dispense Refill Last Dose/Taking    OXcarbazepine 300 MG Oral Tab Take 2 tablets (600 mg total) by mouth 2 (two) times daily. 120 tablet 11 10/18/2024 Morning    ondansetron 4 MG Oral Tablet Dispersible Take 1 tablet (4 mg total) by mouth every 8 (eight) hours as needed for Nausea. 15 tablet 1 10/18/2024 Morning    escitalopram 20 MG Oral Tab Take 1 tablet (20 mg total) by mouth daily.   10/18/2024 Morning    [] HYDROcodone-acetaminophen 5-325 MG Oral Tab Take 1-2 tablets by mouth every 6 (six) hours as needed. 10 tablet 0     [] cephALEXin 500 MG Oral Cap Take 1 capsule (500 mg total) by mouth 3 (three) times daily for 7 days. 21 capsule 0     HYDROcodone-acetaminophen 5-325 MG Oral Tab Take 1 tablet by mouth 3 (three) times daily as needed for Pain. (Patient not taking: Reported on 10/10/2024)       butalbital-acetaminophen-caffeine -40 MG Oral Tab Take 1 tablet by mouth every 4 (four) hours as needed for Headaches.   Unknown    [] ondansetron 4 MG Oral Tablet Dispersible Take 1 tablet (4 mg total) by mouth every 8 (eight) hours as needed for Nausea (vomiting). 10 tablet 0     propranolol 10 MG Oral Tab Take 1 tablet (10 mg total) by mouth 3 (three) times daily as needed (Anxiety). Hold if heart rate less than 90 (Patient not taking: Reported on 2024) 90 tablet 0     [] diazePAM 2 MG Oral Tab Take 1 tablet (2 mg total) by mouth every 12 (twelve) hours as needed for Anxiety (spasm). 6 tablet 0      amphetamine-dextroamphetamine 10 MG Oral Tab TAKE 1 TABLET BY MOUTH TWICE DAILY FOR ATTENTION (Patient not taking: Reported on 10/10/2024)       Naloxone HCl (NARCAN) 4 MG/0.1ML Nasal Liquid 1 spray by Nasal route as needed.

## 2024-10-24 ENCOUNTER — PATIENT OUTREACH (OUTPATIENT)
Dept: CASE MANAGEMENT | Age: 34
End: 2024-10-24

## 2024-10-24 NOTE — DISCHARGE SUMMARY
[facility]  DISCHARGE SUMMARY     Cecily Garsia Patient Status:  Inpatient    1990 MRN [id number]   Location [facility] 3NE-A Attending No att. providers found   Hosp Day # 4 PCP None Pcp     Date of Admission: 10/18/2024  Date of Discharge:  10/23/2024     Discharge Disposition: Home or Self Care    Discharge Diagnosis:  Abdominal pain  Chronic pain due to underlying endometriosis  Depression  Seizure-like episodes  Seizure disorder    History of Present Illness:   Cecily Garsia is a 34 year old female with history of epilepsy, endometriosis presents emergency room with abdominal pain has been going on for the last 10 days.  Pain is located in the right flank and lower quadrant.  Patient was seen in the ER last week had a urine that was questionable and was treated with Keflex.  Patient states that since then she has been having some diarrhea about 25 episodes yesterday and about 15 today.  Patient reports small amount of red blood in the diarrhea.  Patient had some nausea and vomiting.  No fevers, chills       Brief Synopsis:   Patient is a 34-year-old female admitted with abdominal pain and seizure-like episodes  She was ruled out for acute seizure episodes.  Her brain imaging was also unremarkable as well as EEG.  She was seen by neurology.  She is continued on her prior to admission seizure medications.  She also had symptoms of abdominal pain.  She underwent a EGD and colonoscopy which showed gastric and duodenal ulcers as well as a hiatal hernia and irregular Z-line.  She remained stable postoperatively.  She was discharged home with outpatient follow-up.        Lace+ Score: 51  59-90 High Risk  29-58 Medium Risk  0-28   Low Risk  Patient was referred to the [facility].    TCM Follow-Up Recommendation:  LACE > 58: High Risk of readmission after discharge from the hospital.      Procedures during hospitalization:   EGD and  C-scope    Consultants:  Gastroenterology  Neurology    Discharge Medication List:     Discharge Medications        START taking these medications        Instructions Prescription details   HYDROcodone-acetaminophen  MG Tabs  Commonly known as: Norco  Replaces: HYDROcodone-acetaminophen 5-325 MG Tabs  Notes to patient: As needed for pain every 8 hours      Take 1 tablet by mouth every 8 (eight) hours as needed.   Quantity: 30 tablet  Refills: 0     lamoTRIgine 25 MG Tabs  Commonly known as: LaMICtal  Notes to patient: Treats and prevents convulsions      Take 2 tablets (50 mg total) by mouth daily.   Quantity: 60 tablet  Refills: 2     pantoprazole 40 MG Tbec  Commonly known as: Protonix  Notes to patient: Decreases acid production in stomach      Take 1 tablet (40 mg total) by mouth 2 (two) times daily before meals.   Quantity: 60 tablet  Refills: 0            CHANGE how you take these medications        Instructions Prescription details   ondansetron 4 MG Tbdp  Commonly known as: Zofran-ODT  What changed: Another medication with the same name was removed. Continue taking this medication, and follow the directions you see here.  Notes to patient: Every 8 hours ad needed for nausea/vomiting      Take 1 tablet (4 mg total) by mouth every 8 (eight) hours as needed for Nausea.   Quantity: 15 tablet  Refills: 1            CONTINUE taking these medications        Instructions Prescription details   butalbital-acetaminophen-caffeine -40 MG Tabs  Commonly known as: Fioricet  Notes to patient: Every 4 hours as needed for headache      Take 1 tablet by mouth every 4 (four) hours as needed for Headaches.   Refills: 0     escitalopram 20 MG Tabs  Commonly known as: Lexapro  Notes to patient: Treats symptoms of depression      Take 1 tablet (20 mg total) by mouth daily.   Refills: 0     OXcarbazepine 300 MG Tabs  Commonly known as: Trileptal  Notes to patient: It prevents and controls seizures      Take 2 tablets  (600 mg total) by mouth 2 (two) times daily.   Quantity: 120 tablet  Refills: 11            STOP taking these medications      amphetamine-dextroamphetamine 10 MG Tabs  Commonly known as: Adderall        cephALEXin 500 MG Caps  Commonly known as: Keflex        diazePAM 2 MG Tabs  Commonly known as: Valium        HYDROcodone-acetaminophen 5-325 MG Tabs  Commonly known as: Norco  Replaced by: HYDROcodone-acetaminophen  MG Tabs        Narcan 4 MG/0.1ML Liqd  Generic drug: Naloxone HCl        propranolol 10 MG Tabs  Commonly known as: Inderal                  Where to Get Your Medications        These medications were sent to [id number] - [place], [place] - [address] AT [facility] [address] [facility], [phone], [phone] [address], [facility] [address]      Hours: 24-hours Phone: [phone]   HYDROcodone-acetaminophen  MG Tabs  lamoTRIgine 25 MG Tabs  pantoprazole 40 MG [facility]         [place] reviewed[place]    Follow-up appointment:   [facility]  [address]  [facility] [address] [phone]  Schedule an appointment as soon as possible for a visit      Juan Zarate MD  [address]  [facility] [address] [phone]    Schedule an appointment as soon as possible for a visit  Make appoinment for follow up appointment for in 4 weeks    Ian Banks MD  [address]  [facility] [address] [phone]    Schedule an appointment as soon as possible for a visit  Make appointment to be seen in 4 weeks    Repeat EGD in 8 weeks.  Call office to make appoinment    Appointments for Next 30 Days 10/24/2024 - 11/23/2024        Date Arrival Time Visit Type Length Department Provider     11/1/2024  1:00 PM  PROCEDURE [3793] 60 min [facility], [place] - OB/GYN Wanda West, DO    Patient Instructions:     Thank you for trusting us with your care! Below are instructions to help prepare  you for your upcoming in-office procedure.    Make sure you eat a light meal at least 30-60mins prior to your appointment time  To help minimize discomfort during your procedure, please take 1000mg of Tylenol or 600mg of Ibuprofen 30-60mins prior to your appointment.   If you are scheduled for an in-office hysteroscopy, do not take Ibuprofen.    Please be prepared to provide a urine sample prior to having your procedure    If you have any questions or concerns prior to your procedure, please feel free to contact the office to speak to one of the nurses.        Location Instructions:     Masks are optional for all patients and visitors, unless otherwise indicated.                      Vital signs:  Temp:  [97.5 °F (36.4 °C)] 97.5 °F (36.4 °C)  Pulse:  [53-62] 53  Resp:  [14-22] 15  BP: ()/(64-85) 142/78  SpO2:  [96 %-99 %] 98 %    Physical Exam:    General: No acute distress   Lungs: clear to auscultation  Cardiovascular: S1, S2  Abdomen: Soft      -----------------------------------------------------------------------------------------------  PATIENT DISCHARGE INSTRUCTIONS: See electronic chart    Kizzy De Los Santos MD    Total time spent on discharge plannin minutes     The  [employer] C[employer] Act makes medical notes like these available to patients in the interest of transparency. Please be advised this is a medical document. Medical documents are intended to carry relevant information, facts as evident, and the clinical opinion of the practitioner. The medical note is intended as peer to peer communication and may appear blunt or direct. It is written in medical language and may contain abbreviations or verbiage that are unfamiliar.

## 2024-10-24 NOTE — PROGRESS NOTES
LM for pt to call [facility] for condition update since discharge. [facility] phone number was provided for pt to call back.    [facility] contact information was provided for pt to call back as well.

## 2024-10-25 LAB — OXCARBAZEPINE LVL: 18 UG/ML

## 2024-10-27 RX ORDER — BUTALBITAL, ACETAMINOPHEN AND CAFFEINE 50; 325; 40 MG/1; MG/1; MG/1
1 TABLET ORAL EVERY 4 HOURS PRN
Qty: 20 TABLET | Refills: 0 | Status: SHIPPED | OUTPATIENT
Start: 2024-10-27

## 2024-10-28 ENCOUNTER — PATIENT OUTREACH (OUTPATIENT)
Dept: CASE MANAGEMENT | Age: 34
End: 2024-10-28

## 2024-10-28 NOTE — PROGRESS NOTES
[facility] appointment request (discharged 10/23)    [facility]  [address]  [place] [address]  [phone]    Attempt #1:  Left message on voicemail for patient to call transitions specialist back to schedule follow up appointments. Provided Transitions specialist scheduling phone number [phone].

## 2024-10-29 ENCOUNTER — APPOINTMENT (OUTPATIENT)
Dept: CT IMAGING | Age: 34
End: 2024-10-29
Attending: EMERGENCY MEDICINE
Payer: MEDICAID

## 2024-10-29 ENCOUNTER — HOSPITAL ENCOUNTER (EMERGENCY)
Age: 34
Discharge: HOME | End: 2024-10-29
Attending: EMERGENCY MEDICINE
Payer: MEDICAID

## 2024-10-29 VITALS
DIASTOLIC BLOOD PRESSURE: 71 MMHG | HEART RATE: 94 BPM | TEMPERATURE: 98 F | OXYGEN SATURATION: 99 % | HEIGHT: 64 IN | WEIGHT: 123.44 LBS | SYSTOLIC BLOOD PRESSURE: 108 MMHG | BODY MASS INDEX: 21.07 KG/M2 | RESPIRATION RATE: 16 BRPM

## 2024-10-29 DIAGNOSIS — R10.9 ABDOMINAL PAIN OF UNKNOWN ETIOLOGY: Primary | ICD-10-CM

## 2024-10-29 LAB
ALBUMIN SERPL-MCNC: 4 G/DL (ref 3.4–5)
ALBUMIN/GLOB SERPL: 1 {RATIO} (ref 1–2)
ALP LIVER SERPL-CCNC: 70 U/L
ALT SERPL-CCNC: 35 U/L
ANION GAP SERPL CALC-SCNC: 6 MMOL/L (ref 0–18)
AST SERPL-CCNC: 25 U/L (ref 15–37)
B-HCG UR QL: NEGATIVE
BASOPHILS # BLD AUTO: 0.05 X10(3) UL (ref 0–0.2)
BASOPHILS NFR BLD AUTO: 0.5 %
BILIRUB SERPL-MCNC: 0.2 MG/DL (ref 0.1–2)
BILIRUB UR QL STRIP.AUTO: NEGATIVE
BUN BLD-MCNC: 9 MG/DL (ref 9–23)
CALCIUM BLD-MCNC: 9.2 MG/DL (ref 8.5–10.1)
CHLORIDE SERPL-SCNC: 98 MMOL/L (ref 98–112)
CLARITY UR REFRACT.AUTO: CLEAR
CO2 SERPL-SCNC: 28 MMOL/L (ref 21–32)
COLOR UR AUTO: YELLOW
CREAT BLD-MCNC: 0.77 MG/DL
EGFRCR SERPLBLD CKD-EPI 2021: 105 ML/MIN/1.73M2 (ref 60–?)
EOSINOPHIL # BLD AUTO: 0.2 X10(3) UL (ref 0–0.7)
EOSINOPHIL NFR BLD AUTO: 1.9 %
ERYTHROCYTE [DISTWIDTH] IN BLOOD BY AUTOMATED COUNT: 12.1 %
GLOBULIN PLAS-MCNC: 4 G/DL (ref 2.8–4.4)
GLUCOSE BLD-MCNC: 105 MG/DL (ref 70–99)
GLUCOSE UR STRIP.AUTO-MCNC: NEGATIVE MG/DL
HCG SERPL QL: NEGATIVE
HCT VFR BLD AUTO: 40.7 %
HGB BLD-MCNC: 14 G/DL
IMM GRANULOCYTES # BLD AUTO: 0.05 X10(3) UL (ref 0–1)
IMM GRANULOCYTES NFR BLD: 0.5 %
KETONES UR STRIP.AUTO-MCNC: NEGATIVE MG/DL
LEUKOCYTE ESTERASE UR QL STRIP.AUTO: NEGATIVE
LIPASE SERPL-CCNC: 37 U/L (ref 12–53)
LYMPHOCYTES # BLD AUTO: 2.31 X10(3) UL (ref 1–4)
LYMPHOCYTES NFR BLD AUTO: 22.5 %
MCH RBC QN AUTO: 33.1 PG (ref 26–34)
MCHC RBC AUTO-ENTMCNC: 34.4 G/DL (ref 31–37)
MCV RBC AUTO: 96.2 FL
MONOCYTES # BLD AUTO: 0.72 X10(3) UL (ref 0.1–1)
MONOCYTES NFR BLD AUTO: 7 %
NEUTROPHILS # BLD AUTO: 6.94 X10 (3) UL (ref 1.5–7.7)
NEUTROPHILS # BLD AUTO: 6.94 X10(3) UL (ref 1.5–7.7)
NEUTROPHILS NFR BLD AUTO: 67.6 %
NITRITE UR QL STRIP.AUTO: NEGATIVE
OSMOLALITY SERPL CALC.SUM OF ELEC: 273 MOSM/KG (ref 275–295)
PH UR STRIP.AUTO: 7 [PH] (ref 5–8)
PLATELET # BLD AUTO: 292 10(3)UL (ref 150–450)
POTASSIUM SERPL-SCNC: 4 MMOL/L (ref 3.5–5.1)
PROT SERPL-MCNC: 8 G/DL (ref 6.4–8.2)
PROT UR STRIP.AUTO-MCNC: NEGATIVE MG/DL
RBC # BLD AUTO: 4.23 X10(6)UL
RBC UR QL AUTO: NEGATIVE
SODIUM SERPL-SCNC: 132 MMOL/L (ref 136–145)
SP GR UR STRIP.AUTO: 1.02 (ref 1–1.03)
UROBILINOGEN UR STRIP.AUTO-MCNC: 0.2 MG/DL
WBC # BLD AUTO: 10.3 X10(3) UL (ref 4–11)

## 2024-10-29 PROCEDURE — 96374 THER/PROPH/DIAG INJ IV PUSH: CPT

## 2024-10-29 PROCEDURE — 83690 ASSAY OF LIPASE: CPT | Performed by: EMERGENCY MEDICINE

## 2024-10-29 PROCEDURE — 81025 URINE PREGNANCY TEST: CPT

## 2024-10-29 PROCEDURE — 80053 COMPREHEN METABOLIC PANEL: CPT | Performed by: EMERGENCY MEDICINE

## 2024-10-29 PROCEDURE — 74177 CT ABD & PELVIS W/CONTRAST: CPT | Performed by: EMERGENCY MEDICINE

## 2024-10-29 PROCEDURE — S0028 INJECTION, FAMOTIDINE, 20 MG: HCPCS | Performed by: EMERGENCY MEDICINE

## 2024-10-29 PROCEDURE — 96375 TX/PRO/DX INJ NEW DRUG ADDON: CPT

## 2024-10-29 PROCEDURE — 96376 TX/PRO/DX INJ SAME DRUG ADON: CPT

## 2024-10-29 PROCEDURE — 99285 EMERGENCY DEPT VISIT HI MDM: CPT

## 2024-10-29 PROCEDURE — 81003 URINALYSIS AUTO W/O SCOPE: CPT | Performed by: EMERGENCY MEDICINE

## 2024-10-29 PROCEDURE — 99284 EMERGENCY DEPT VISIT MOD MDM: CPT

## 2024-10-29 PROCEDURE — 84703 CHORIONIC GONADOTROPIN ASSAY: CPT | Performed by: EMERGENCY MEDICINE

## 2024-10-29 PROCEDURE — 85025 COMPLETE CBC W/AUTO DIFF WBC: CPT | Performed by: EMERGENCY MEDICINE

## 2024-10-29 RX ORDER — MORPHINE SULFATE 4 MG/ML
4 INJECTION, SOLUTION INTRAMUSCULAR; INTRAVENOUS ONCE
Status: COMPLETED | OUTPATIENT
Start: 2024-10-29 | End: 2024-10-29

## 2024-10-29 RX ORDER — ONDANSETRON 4 MG/1
4 TABLET, ORALLY DISINTEGRATING ORAL EVERY 4 HOURS PRN
Qty: 10 TABLET | Refills: 0 | Status: SHIPPED | OUTPATIENT
Start: 2024-10-29 | End: 2024-10-29

## 2024-10-29 RX ORDER — HYDROCODONE BITARTRATE AND ACETAMINOPHEN 5; 325 MG/1; MG/1
1-2 TABLET ORAL EVERY 6 HOURS PRN
Qty: 10 TABLET | Refills: 0 | Status: SHIPPED | OUTPATIENT
Start: 2024-10-29 | End: 2024-11-03

## 2024-10-29 RX ORDER — DOCUSATE SODIUM 100 MG/1
100 CAPSULE, LIQUID FILLED ORAL 2 TIMES DAILY
Qty: 28 CAPSULE | Refills: 0 | Status: SHIPPED | OUTPATIENT
Start: 2024-10-29 | End: 2024-11-12

## 2024-10-29 RX ORDER — LORAZEPAM 2 MG/ML
1 INJECTION INTRAMUSCULAR ONCE
Status: COMPLETED | OUTPATIENT
Start: 2024-10-29 | End: 2024-10-29

## 2024-10-29 RX ORDER — FAMOTIDINE 10 MG/ML
20 INJECTION, SOLUTION INTRAVENOUS ONCE
Status: COMPLETED | OUTPATIENT
Start: 2024-10-29 | End: 2024-10-29

## 2024-10-29 NOTE — PROGRESS NOTES
[facility] appointment request (discharged 10/23)     [facility]  [address]  [place] [address]  [phone]     Attempt #2:  Left message on voicemail for patient to call transitions specialist back to schedule follow up appointments. Provided Transitions specialist scheduling phone number [phone].

## 2024-10-29 NOTE — ED PROVIDER NOTES
Patient Seen in: [facility] In [place]      History     Chief Complaint   Patient presents with    Abdomen/Flank Pain     Stated Complaint: lower abd pain    Subjective:   HPI      34-year-old female with past medical history of endometriosis presents today for evaluation of abdominal pain.  Earlier this month, patient was evaluated in the ER because of abdominal pain.  Workup did not demonstrate significant acute abnormality.  She return to the hospital on  and was admitted for 5 days.  She had a endoscopy and colonoscopy.  Endoscopy showed some ulcers.  There was concern for seizures however documentation states that she is ruled out.  She states that abdominal pain has persisted.  She takes hydrocodone with Tranxene controls the pain all the pain returns.  She is advised to follow-up with OB however has not done yet because of insurance issues.  She reports vomiting, diarrhea, urinary frequency (although then states doesn't urinate a lot, only twice a day), dysuria, fevers.    Objective:     Past Medical History:    Endometriosis    Epilepsy (HCC)    Kidney stones    Pelvic congestion syndrome              Past Surgical History:   Procedure Laterality Date    Colonoscopy N/A 10/23/2024    Procedure: COLONOSCOPY;  Surgeon: Ian Banks MD;  Location:  ENDOSCOPY    Laparoscopic assisted  01/10/2014    varicose vein on ovaries, minimal endometriotic lesions cul de sac, normal ovaries/tubes    Other  01/10/2014    laproscopic endometrial                Social History     Socioeconomic History    Marital status: Life Partner   Tobacco Use    Smoking status: Former     Current packs/day: 0.00     Types: Cigarettes     Quit date: 2020     Years since quittin.7     Passive exposure: Past    Smokeless tobacco: Never   Vaping Use    Vaping status: Some Days    Substances: Nicotine   Substance and Sexual Activity    Alcohol use: No     Alcohol/week: 0.0 standard drinks of  alcohol    Drug use: No    Sexual activity: Yes     Partners: Male   Other Topics Concern    Caffeine Concern Yes    Exercise Yes     Social Drivers of Health     Food Insecurity: No Food Insecurity (10/19/2024)    Food Insecurity     Food Insecurity: Never true   Transportation Needs: No Transportation Needs (10/19/2024)    Transportation Needs     Lack of Transportation: No    Received from [facility]    Social Connections   Housing Stability: Low Risk  (10/19/2024)    Housing Stability     Housing Instability: No                  Physical Exam     ED Triage Vitals [10/29/24 0946]   BP (!) 120/92   Pulse 85   Resp 20   Temp 97.9 °F (36.6 °C)   Temp src Temporal   SpO2 100 %   O2 Device None (Room air)       Current Vitals:   Vital Signs  BP: 108/71  Pulse: 94  Resp: 16  Temp: 97.9 °F (36.6 °C)  Temp src: Temporal    Oxygen Therapy  SpO2: 99 %  O2 Device: None (Room air)        Physical Exam  Vitals and nursing note reviewed.   Constitutional:       Appearance: Normal appearance.   HENT:      Head: Normocephalic.      Nose: Nose normal.      Mouth/Throat:      Mouth: Mucous membranes are moist.   Eyes:      Extraocular Movements: Extraocular movements intact.   Cardiovascular:      Rate and Rhythm: Normal rate.   Pulmonary:      Effort: Pulmonary effort is normal.   Abdominal:      General: Abdomen is flat.      Tenderness: There is generalized abdominal tenderness. There is no guarding or rebound.   Musculoskeletal:         General: Normal range of motion.   Skin:     General: Skin is warm.   Neurological:      General: No focal deficit present.      Mental Status: She is alert.   Psychiatric:         Mood and Affect: Mood normal.       ED Course     Labs Reviewed   COMP METABOLIC PANEL (14) - Abnormal; Notable for the following components:       Result Value    Glucose 105 (*)     Sodium 132 (*)     Calculated Osmolality 273 (*)     All other components within normal limits   LIPASE - Normal    HCG, BETA SUBUNIT, QUAL - Normal   POCT PREGNANCY URINE - Normal   CBC WITH DIFFERENTIAL WITH PLATELET   URINALYSIS WITH CULTURE REFLEX            CT ABDOMEN+PELVIS(CONTRAST ONLY)(CPT=74177)    Result Date: 10/29/2024  PROCEDURE:  CT ABDOMEN+PELVIS (CONTRAST ONLY) (CPT=74177)  COMPARISON:  [facility], CT, CT ABDOMEN+PELVIS(CONTRAST ONLY)(CPT=74177), 10/18/2024, 9:21 PM.  INDICATIONS:  lower abd pain  TECHNIQUE:  CT scanning was performed from the dome of the diaphragm to the pubic symphysis with non-ionic intravenous contrast material. Post contrast coronal MPR imaging was performed.  Dose reduction techniques were used. Dose information is transmitted to the ACR (American College of Radiology) NRDR (National Radiology Data Registry) which includes the Dose Index Registry.  PATIENT STATED HISTORY:(As transcribed by Technologist)  Patient has bilateral lower abdominal pain.   CONTRAST USED:   FINDINGS:    LIVER:  Unremarkable.  BILIARY:  Mildly dilated gallbladder possibly with some subtle layering sludge, no dense gallbladder stone.  No surrounding fluid.  No sign of biliary ductal dilation  PANCREAS:  Unremarkable.  SPLEEN:  Unremarkable.  KIDNEYS:  No acute abnormality.  ADRENALS:  Unremarkable.  AORTA/VASCULAR:  No aortic aneurysm.  RETROPERITONEUM:  Unremarkable.  BOWEL/MESENTERY:  Large amount of stool in the colon especially left colon, sigmoid colon and to a lesser extent rectum comma and to a lesser extent transverse colon and right colon where there is liquid stool in the right colon and transverse colon.  No  colonic wall thickening.  No excessive fluid or sign of obstruction small-bowel.  Trace free pelvic fluid but no large or drainable ascites.  No free air.  ABDOMINAL WALL:  Nonspecific nodule may reflect lymph node anterior abdominal wall on the left image 69 measuring 9 mm. .  URINARY BLADDER:  Unremarkable.  LYMPH NODES PELVIS:  Unremarkable.  PELVIC ORGANS:  No acute process.  Normal size right  ovarian follicle.  Small fluid endometrium.  No endometrial enlargement.  No suspicious adnexal mass.  LUNG BASES:  No acute process.  BONES:  No acute abnormality.              CONCLUSION:  Large amount of stool in the left colon, and some primarily liquid stool in the transverse colon and right colon, likely combination of constipation and colonic enteritis.  Trace free fluid.  No large ascites or free air.  No small bowel obstruction.  Mildly dilated gallbladder with sludge.   LOCATION:  [facility]   Dictated by (CST): Jose Chairez MD on 10/29/2024 at 12:17 PM     Finalized by (CST): Jose Chairez MD on 10/29/2024 at 12:21 PM       MRI BRAIN (CPT=70551)    Result Date: 10/21/2024  PROCEDURE:  MRI BRAIN (CPT=70551)  COMPARISON:  None.  INDICATIONS:  Seizure like episodes  TECHNIQUE:  MRI of the brain was performed with multi-planar T1, T2-weighted images with FLAIR sequences and diffusion weighted images without infusion.  PATIENT STATED HISTORY: (As transcribed by Technologist)  Patient is being evaluated for Seizure like episodes    FINDINGS:   The ventricles and sulci are normal in size for the patient's age.  No mass-effect, midline shift, hydrocephalus, herniation, extra-axial fluid collections, or signs of acute territorial infarct, or brain tumor.  No abnormality of midline structures. No sign of restricted diffusion. No pathologic gradient susceptibility pattern.  Flow voids are present within the internal carotid and basilar arteries. No sign of acute fluid in the paranasal sinuses.            CONCLUSION:  No acute abnormality seen   LOCATION:  [facility]   Dictated by (CST): Jose Chairez MD on 10/21/2024 at 11:34 PM     Finalized by (CST): Jose Chairez MD on 10/21/2024 at 11:36 PM       NM GB HEPATOBILIARY SCAN WITH PHARMACY  (CPT=78227)    Result Date: 10/21/2024  PROCEDURE:  NM GB HEPATOBILIARY SCAN WITH PHARMACY  (CPT=78227)  COMPARISON:  None.  INDICATIONS:  abd pain  TECHNIQUE:  Tc99m labeled  mebrofenin was injected IV, and dynamic images of the abdomen were obtained in the anterior projection for one hour.  This was followed by IV administration of Morphine sulfate followed by additional 30 minutes of dynamic anterior abdominal imaging.  RADIOPHARMACEUTICAL(S):  6.2 mCi Tc-99m mebrofenin.   2.0 mg Morphine Sulfate  FINDINGS:  Hepatic activity normal with no defect, and appropriate clearance of activity from the liver parenchyma.  No sign of obstruction of the common bile duct.  Activity passes from the liver into the common bile duct and into the bowel without sign of delay.  The gallbladder does not visualize initially, but then morphine was administered in the gallbladder appropriately visualizes.            CONCLUSION:  No sign of obstruction of the cystic duct or common bile duct.  Gallbladder does not initially visualized on the 1st set of imaging, but then after morphine was administered, the gallbladder clearly visualizes.   LOCATION:  [facility]   Dictated by (CST): Jose Chairez MD on 10/21/2024 at 5:37 PM     Finalized by (CST): Jose Chairez MD on 10/21/2024 at 5:39 PM              MDM      Differential Diagnosis  34-year-old female returns to the ER with complaint of abdominal pain.  She has had recent evaluation in the hospitalization that showed stomach ulcers however did not demonstrate acute abnormality.  Patient reports pain in her upper abdomen and lower abdomen.  She states the lower abdominal pain is different.  She reports vomiting, diarrhea, dysuria, hematuria, fevers.  Her belly is soft without guarding or rigidity.  Will obtain CT of the abdomen to assess for any intestinal perforation, diverticulitis, pancreatitis, appendicitis.  Plan for labs along with UA.  Will give pain medicine and reassess.    1:27 pm  CT demonstrated a large amount of stool within the colon.  There was a mildly dilated gallbladder although her LFTs are normal and she has no leukocytosis, I do not suspect  cholecystitis.  Additionally, she just had a HIDA scan for similar pain which was unremarkable.  The source of patient's symptoms remains unclear.  She was advised to continue outpatient evaluation.    External Chart Reviewed  Pelvic ultrasound from October 11 unremarkable.  CT from October 18 to the mild bladder wall thickening.  Endoscopy and colonoscopy demonstrated ulcers.  I reviewed the discharge summary from her hospitalization.        Medical Decision Making      Disposition and Plan     Clinical Impression:  1. Abdominal pain of unknown etiology         Disposition:  Discharge  10/29/2024  1:37 pm    Follow-up:  Hari Zamorano MD  [address] [facility] [address]  [phone]    Call in 1 day(s)            Medications Prescribed:  Current Discharge Medication List        START taking these medications    Details   HYDROcodone-acetaminophen 5-325 MG Oral Tab Take 1-2 tablets by mouth every 6 (six) hours as needed.  Qty: 10 tablet, Refills: 0    Associated Diagnoses: Abdominal pain of unknown etiology      docusate sodium 100 MG Oral Cap Take 1 capsule (100 mg total) by mouth 2 (two) times daily for 14 days.  Qty: 28 capsule, Refills: 0                 Supplementary Documentation:

## 2024-10-29 NOTE — ED INITIAL ASSESSMENT (HPI)
Pt reports low abd/pelvic pain since 10/7, states she was here on 10/10 and discharged. Pt returned on 10/18 and was admitted. Pt was discharged 6 days ago and states she has had pain ever since with n/v and decreased/painful urination.

## 2024-10-29 NOTE — DISCHARGE INSTRUCTIONS
Follow-up with the surgeon and a gynecologist for further evaluation of your pain.  Take the pain medicine as needed.  Do not combine any pain medications.  Norco can make you drowsy, so be careful when taking it.

## 2024-10-30 NOTE — PROGRESS NOTES
T[facility] appointment request (discharged 10/23)     [facility]  [address]  [place] [address]  [phone]  Multiple attempts w/no calls back; no appointment made  Closing encounter    Attempt #3:  Left message on voicemail for patient to call transitions specialist back to schedule follow up appointments. Provided Transitions specialist scheduling phone number [phone]. Closing encounter. Will re-open if patient returns call.

## 2025-01-22 ENCOUNTER — HOSPITAL ENCOUNTER (EMERGENCY)
Facility: HOSPITAL | Age: 35
Discharge: HOME | End: 2025-01-23
Attending: STUDENT IN AN ORGANIZED HEALTH CARE EDUCATION/TRAINING PROGRAM
Payer: MEDICAID

## 2025-01-22 ENCOUNTER — APPOINTMENT (OUTPATIENT)
Dept: CT IMAGING | Facility: HOSPITAL | Age: 35
End: 2025-01-22
Attending: STUDENT IN AN ORGANIZED HEALTH CARE EDUCATION/TRAINING PROGRAM
Payer: MEDICAID

## 2025-01-22 VITALS
TEMPERATURE: 98 F | HEIGHT: 64 IN | BODY MASS INDEX: 19.63 KG/M2 | HEART RATE: 76 BPM | OXYGEN SATURATION: 99 % | SYSTOLIC BLOOD PRESSURE: 109 MMHG | WEIGHT: 115 LBS | RESPIRATION RATE: 20 BRPM | DIASTOLIC BLOOD PRESSURE: 73 MMHG

## 2025-01-22 DIAGNOSIS — R20.2 PARESTHESIAS: ICD-10-CM

## 2025-01-22 DIAGNOSIS — G43.809 OTHER MIGRAINE WITHOUT STATUS MIGRAINOSUS, NOT INTRACTABLE: Primary | ICD-10-CM

## 2025-01-22 LAB
ANION GAP SERPL CALC-SCNC: 5 MMOL/L (ref 0–18)
BASOPHILS # BLD AUTO: 0.04 X10(3) UL (ref 0–0.2)
BASOPHILS NFR BLD AUTO: 0.5 %
BUN BLD-MCNC: 8 MG/DL (ref 9–23)
CALCIUM BLD-MCNC: 9.5 MG/DL (ref 8.7–10.6)
CHLORIDE SERPL-SCNC: 105 MMOL/L (ref 98–112)
CO2 SERPL-SCNC: 28 MMOL/L (ref 21–32)
CREAT BLD-MCNC: 0.67 MG/DL
EGFRCR SERPLBLD CKD-EPI 2021: 118 ML/MIN/1.73M2 (ref 60–?)
EOSINOPHIL # BLD AUTO: 0.22 X10(3) UL (ref 0–0.7)
EOSINOPHIL NFR BLD AUTO: 2.8 %
ERYTHROCYTE [DISTWIDTH] IN BLOOD BY AUTOMATED COUNT: 12 %
GLUCOSE BLD-MCNC: 102 MG/DL (ref 70–99)
HCT VFR BLD AUTO: 36.1 %
HGB BLD-MCNC: 12.5 G/DL
IMM GRANULOCYTES # BLD AUTO: 0.02 X10(3) UL (ref 0–1)
IMM GRANULOCYTES NFR BLD: 0.3 %
LYMPHOCYTES # BLD AUTO: 2.71 X10(3) UL (ref 1–4)
LYMPHOCYTES NFR BLD AUTO: 34.4 %
MCH RBC QN AUTO: 32.4 PG (ref 26–34)
MCHC RBC AUTO-ENTMCNC: 34.6 G/DL (ref 31–37)
MCV RBC AUTO: 93.5 FL
MONOCYTES # BLD AUTO: 0.56 X10(3) UL (ref 0.1–1)
MONOCYTES NFR BLD AUTO: 7.1 %
NEUTROPHILS # BLD AUTO: 4.33 X10 (3) UL (ref 1.5–7.7)
NEUTROPHILS # BLD AUTO: 4.33 X10(3) UL (ref 1.5–7.7)
NEUTROPHILS NFR BLD AUTO: 54.9 %
OSMOLALITY SERPL CALC.SUM OF ELEC: 285 MOSM/KG (ref 275–295)
PLATELET # BLD AUTO: 275 10(3)UL (ref 150–450)
POTASSIUM SERPL-SCNC: 4 MMOL/L (ref 3.5–5.1)
RBC # BLD AUTO: 3.86 X10(6)UL
SODIUM SERPL-SCNC: 138 MMOL/L (ref 136–145)
WBC # BLD AUTO: 7.9 X10(3) UL (ref 4–11)

## 2025-01-22 PROCEDURE — 99285 EMERGENCY DEPT VISIT HI MDM: CPT

## 2025-01-22 PROCEDURE — 80048 BASIC METABOLIC PNL TOTAL CA: CPT | Performed by: STUDENT IN AN ORGANIZED HEALTH CARE EDUCATION/TRAINING PROGRAM

## 2025-01-22 PROCEDURE — 96375 TX/PRO/DX INJ NEW DRUG ADDON: CPT

## 2025-01-22 PROCEDURE — 70450 CT HEAD/BRAIN W/O DYE: CPT | Performed by: STUDENT IN AN ORGANIZED HEALTH CARE EDUCATION/TRAINING PROGRAM

## 2025-01-22 PROCEDURE — 85025 COMPLETE CBC W/AUTO DIFF WBC: CPT | Performed by: STUDENT IN AN ORGANIZED HEALTH CARE EDUCATION/TRAINING PROGRAM

## 2025-01-22 PROCEDURE — 99284 EMERGENCY DEPT VISIT MOD MDM: CPT

## 2025-01-22 PROCEDURE — 96374 THER/PROPH/DIAG INJ IV PUSH: CPT

## 2025-01-22 PROCEDURE — 96361 HYDRATE IV INFUSION ADD-ON: CPT

## 2025-01-22 RX ORDER — DIPHENHYDRAMINE HYDROCHLORIDE 50 MG/ML
25 INJECTION INTRAMUSCULAR; INTRAVENOUS ONCE
Status: COMPLETED | OUTPATIENT
Start: 2025-01-22 | End: 2025-01-22

## 2025-01-22 RX ORDER — DROPERIDOL 2.5 MG/ML
2.5 INJECTION, SOLUTION INTRAMUSCULAR; INTRAVENOUS ONCE
Status: COMPLETED | OUTPATIENT
Start: 2025-01-22 | End: 2025-01-22

## 2025-01-23 NOTE — ED INITIAL ASSESSMENT (HPI)
Patient complains of R sided numbness for a few days, headache. Patient fell Wednesday. Has neck pain and had seizure on the way here from pain.

## 2025-01-23 NOTE — ED PROVIDER NOTES
History     Chief Complaint   Patient presents with    Seizures       HPI    35 year old female with history of chronic migraines, PNES presents with pain.  Patient states last night she slipped and fell striking her head.  She is having headache.  She is also endorsing her chronic paresthesias on the right side.  Patient is on oxcarbazepine and lamotrigine, follows with neurology. Per chart review, recent EEG negative for epileptiform activity.  Recent MRI brain negative.          Past Medical History:    Endometriosis    Epilepsy (HCC)    Kidney stones    Pelvic congestion syndrome       Past Surgical History:   Procedure Laterality Date    Colonoscopy N/A 10/23/2024    Procedure: COLONOSCOPY;  Surgeon: Ian Banks MD;  Location:  ENDOSCOPY    Laparoscopic assisted  01/10/2014    varicose vein on ovaries, minimal endometriotic lesions cul de sac, normal ovaries/tubes    Other  01/10/2014    laproscopic endometrial       Social History     Socioeconomic History    Marital status: Life Partner   Tobacco Use    Smoking status: Former     Current packs/day: 0.00     Types: Cigarettes     Quit date: 2020     Years since quittin.0     Passive exposure: Past    Smokeless tobacco: Never   Vaping Use    Vaping status: Some Days    Substances: Nicotine   Substance and Sexual Activity    Alcohol use: No     Alcohol/week: 0.0 standard drinks of alcohol    Drug use: No    Sexual activity: Yes     Partners: Male   Other Topics Concern    Caffeine Concern Yes    Exercise Yes     Social Drivers of Health     Food Insecurity: No Food Insecurity (10/19/2024)    Food Insecurity     Food Insecurity: Never true   Transportation Needs: No Transportation Needs (10/19/2024)    Transportation Needs     Lack of Transportation: No    [facility]    Social Connections   Housing Stability: Low Risk  (10/19/2024)    Housing Stability     Housing Instability: No                   Physical  Exam     ED Triage Vitals [01/22/25 2058]   /84   Pulse 98   Resp 24   Temp 98 °F (36.7 °C)   Temp src Temporal   SpO2 99 %   O2 Device None (Room air)       Physical Exam  Constitutional:       General: She is in acute distress.   Eyes:      Extraocular Movements: Extraocular movements intact.   Cardiovascular:      Rate and Rhythm: Normal rate.      Pulses: Normal pulses.   Pulmonary:      Effort: Pulmonary effort is normal. No respiratory distress.   Musculoskeletal:      Cervical back: Normal range of motion.   Neurological:      Mental Status: She is alert.      Cranial Nerves: No cranial nerve deficit.      Sensory: Sensory deficit present.      Motor: No weakness.      Comments: No vf def              ED Course     Labs Reviewed   BASIC METABOLIC PANEL (8) - Abnormal; Notable for the following components:       Result Value    Glucose 102 (*)     BUN 8 (*)     All other components within normal limits   CBC WITH DIFFERENTIAL WITH PLATELET   RAINBOW DRAW LAVENDER   RAINBOW DRAW LIGHT GREEN   RAINBOW DRAW BLUE   RAINBOW DRAW GOLD     CT BRAIN OR HEAD (CPT=70450)    Result Date: 1/22/2025  CONCLUSION:  There is no acute abnormality on the noncontrast CT of the head.    LOCATION:  [facility]   Dictated by (CST): Dao Daley MD on 1/22/2025 at 10:26 PM     Finalized by (CST): Dao Daley MD on 1/22/2025 at 10:28 PM            MDM     Vitals:    01/22/25 2230 01/22/25 2245 01/22/25 2300 01/22/25 2345   BP: 117/89 109/73     Pulse: 65 54 57 76   Resp: 15 24 20 20   Temp:       TempSrc:       SpO2:       Weight:       Height:           Head injury, will get CT to assess for intracranial traumatic injuries such as hemorrhage.  Acute on chronic headaches, paresthesias, likely complex migraines, nonepileptic seizures.  Vitals are reassuring  Fluids, migraine cocktail, will check labs    ED Course as of 01/23/25 0058  ------------------------------------------------------------  Time: 01/22 2140  Comment: Eeg  9/26/24: This EEG is normal in waking, drowsiness, and sleep.  ------------------------------------------------------------  Time: 01/22 7182  Comment: My interpretation of CT head without acute pathology.  Confirmed by radiology  ------------------------------------------------------------  Time: 01/22 6784  Comment: Labs unrevealing     Discussed findings with patient and partner at bedside.  Advised patient continue her current medicines as prescribed by neurology, she can follow-up in clinic for further care.    Disposition and Plan     Clinical Impression:  1. Other migraine without status migrainosus, not intractable    2. Paresthesias        Disposition:  Discharge    Follow-up:  Juan Zarate MD  [facility] [address] [place] [id number]  [phone]    Follow up        Medications Prescribed:  Discharge Medication List as of 1/22/2025 11:52 PM

## 2025-03-08 ENCOUNTER — HOSPITAL ENCOUNTER (EMERGENCY)
Age: 35
Discharge: HOME | End: 2025-03-08
Attending: EMERGENCY MEDICINE

## 2025-03-08 VITALS
WEIGHT: 130 LBS | HEIGHT: 64 IN | BODY MASS INDEX: 22.2 KG/M2 | OXYGEN SATURATION: 99 % | TEMPERATURE: 99 F | SYSTOLIC BLOOD PRESSURE: 139 MMHG | DIASTOLIC BLOOD PRESSURE: 93 MMHG | RESPIRATION RATE: 17 BRPM | HEART RATE: 103 BPM

## 2025-03-08 DIAGNOSIS — K08.89 PAIN, DENTAL: Primary | ICD-10-CM

## 2025-03-08 PROCEDURE — 99284 EMERGENCY DEPT VISIT MOD MDM: CPT

## 2025-03-08 PROCEDURE — 99283 EMERGENCY DEPT VISIT LOW MDM: CPT

## 2025-03-08 RX ORDER — PENICILLIN V POTASSIUM 500 MG/1
500 TABLET, FILM COATED ORAL 4 TIMES DAILY
Qty: 28 TABLET | Refills: 0 | Status: SHIPPED | OUTPATIENT
Start: 2025-03-08 | End: 2025-03-15

## 2025-03-08 RX ORDER — FAMOTIDINE 20 MG/1
20 TABLET, FILM COATED ORAL 2 TIMES DAILY
Qty: 10 TABLET | Refills: 0 | Status: SHIPPED | OUTPATIENT
Start: 2025-03-08 | End: 2025-03-13

## 2025-03-08 RX ORDER — NAPROXEN 250 MG/1
250 TABLET ORAL 2 TIMES DAILY WITH MEALS
Qty: 10 TABLET | Refills: 0 | Status: SHIPPED | OUTPATIENT
Start: 2025-03-08 | End: 2025-03-13

## 2025-03-09 NOTE — ED INITIAL ASSESSMENT (HPI)
Pt to ED with left sided dental pain, pt states a tooth broke from left upper side on Monday. Tylenol 2 hrs PTA.

## 2025-03-09 NOTE — ED PROVIDER NOTES
Patient Seen in: [facility] In [place]      History     Chief Complaint   Patient presents with    Dental Problem     Stated Complaint: dental pain, broke tooth Monday    Subjective:   HPI      34 yo woman here reporting dental pain at 2nd left maxillary premolar since eating a \"gumball\".  Reports associated TMJ pain, says she \"can't open her mouth,\" taking tylenol without relief, found an old norco and took without relief.  Says she has poor dentition to begin with, has had multiple dental procedures in the past.  No facial swelling.  No fever, chill, difficulty eating.drinking.      Objective:     Past Medical History:    Compression fracture of thoracic vertebra (HCC)    Endometriosis    Epilepsy (HCC)    Esophagitis    Kidney stones    Pelvic congestion syndrome              Past Surgical History:   Procedure Laterality Date    Colonoscopy N/A 10/23/2024    Procedure: COLONOSCOPY;  Surgeon: Ian Banks MD;  Location:  ENDOSCOPY    Laparoscopic assisted  01/10/2014    varicose vein on ovaries, minimal endometriotic lesions cul de sac, normal ovaries/tubes    Other  01/10/2014    laproscopic endometrial                Social History     Socioeconomic History    Marital status: Life Partner   Tobacco Use    Smoking status: Former     Current packs/day: 0.00     Types: Cigarettes     Quit date: 2020     Years since quittin.1     Passive exposure: Past    Smokeless tobacco: Never   Vaping Use    Vaping status: Some Days    Substances: Nicotine   Substance and Sexual Activity    Alcohol use: No     Alcohol/week: 0.0 standard drinks of alcohol    Drug use: No    Sexual activity: Yes     Partners: Male   Other Topics Concern    Caffeine Concern Yes    Exercise Yes     Social Drivers of Health     Food Insecurity: No Food Insecurity (10/19/2024)    Food Insecurity     Food Insecurity: Never true   Transportation Needs: No Transportation Needs (10/19/2024)    Transportation Needs      Lack of Transportation: No   Housing Stability: Low Risk  (10/19/2024)    Housing Stability     Housing Instability: No                  Physical Exam     ED Triage Vitals [03/08/25 2206]   BP (!) 139/93   Pulse 103   Resp 17   Temp 99.1 °F (37.3 °C)   Temp src Temporal   SpO2 99 %   O2 Device None (Room air)       Current Vitals:   Vital Signs  BP: (!) 139/93  Pulse: 103  Resp: 17  Temp: 99.1 °F (37.3 °C)  Temp src: Temporal    Oxygen Therapy  SpO2: 99 %  O2 Device: None (Room air)        Physical Exam  Vitals and nursing note reviewed.   Constitutional:       Appearance: Normal appearance.   HENT:      Head: Normocephalic and atraumatic.      Mouth/Throat:      Comments: I do not visualize a significant dental fracture.  I do not see any dentin or pulp exposure.  No facial swelling.    2nd left maxillary premolar is tender to percussion.  The stated affected tooth looks the same as all of her other teeth  Cardiovascular:      Rate and Rhythm: Normal rate.   Pulmonary:      Effort: Pulmonary effort is normal.   Skin:     General: Skin is warm and dry.   Neurological:      Mental Status: She is alert and oriented to person, place, and time.      Comments: Moving all extremities, normal speech   Psychiatric:         Mood and Affect: Mood normal.         Behavior: Behavior normal.             ED Course   Labs Reviewed - No data to display                MDM              Medical Decision Making    Dental pain without any significant findings on exam  I did not appreciate any visible or significant dental pathology other then tenderness to percussion  [place] prescriptions in the past, last opiate prescription in January 2025  Given no signficant findings on exam and [place], I cannot prescribe norco for dental pain.    She states ibuprofen upsets her stomach so recommended low dose naproxen with famotidine.  PCN prescriptions provided.  Dental f/u    No additional testing  warranted at this time.           Disposition and Plan     Clinical Impression:  1. Pain, dental         Disposition:  Discharge  3/8/2025 11:21 pm    Follow-up:  No follow-up provider specified.        Medications Prescribed:  Discharge Medication List as of 3/8/2025 11:23 PM        START taking these medications    Details   naproxen 250 MG Oral Tab Take 1 tablet (250 mg total) by mouth 2 (two) times daily with meals for 5 days., Normal, Disp-10 tablet, R-0      famotidine 20 MG Oral Tab Take 1 tablet (20 mg total) by mouth 2 (two) times daily for 5 days., Normal, Disp-10 tablet, R-0      penicillin v potassium 500 MG Oral Tab Take 1 tablet (500 mg total) by mouth 4 (four) times daily for 7 days., Normal, Disp-28 tablet, R-0                 Supplementary Documentation:

## (undated) DEVICE — V2 SPECIMEN COLLECTION MANIFOLD KIT: Brand: NEPTUNE

## (undated) DEVICE — KIT CUSTOM ENDOPROCEDURE STERIS

## (undated) DEVICE — KIT VLV 5 PC AIR H2O SUCT BX ENDOGATOR CONN

## (undated) DEVICE — 10FT COMBINED O2 DELIVERY/CO2 MONITORING. FILTER WITH MICROSTREAM TYPE LUER: Brand: DUAL ADULT NASAL CANNULA

## (undated) DEVICE — BITEBLOCK ENDOSCP 60FR MAXI STRP

## (undated) DEVICE — 1200CC GUARDIAN II: Brand: GUARDIAN

## (undated) DEVICE — 3M™ RED DOT™ MONITORING ELECTRODE WITH FOAM TAPE AND STICKY GEL, 50/BAG, 20/CASE, 72/PLT 2570: Brand: RED DOT™

## (undated) NOTE — ED AVS SNAPSHOT
THE [facility] Emergency Department in [address] [facility]    Phone:  [phone]    Fax:  [address] [facility]   MRN: [id number]    Department:  THE [facility] Emergency Department in [place]   Date of V START taking these medications     Amoxicillin-Pot Clavulanate 875-125 MG Tabs   Quantity:  20 tablet   Commonly known as:  AUGMENTIN   Take 1 tablet by mouth 2 (two) times daily.        methylPREDNISolone 4 MG Tbpk   Quantity:  1 Package   Commonly known a from our patient liason soon after your visit. Also, some patients receive a detailed feedback survey mailed to them a week after the visit. If you receive this, we would really appreciate it if you could take the time to complete it. Thank you!       You [facility] [address] [facility] [id number] ([address]) [address] [facility]. ([address]) [facility]

## (undated) NOTE — LETTER
Date & Time: 10/11/2024, 2:47 AM  Patient: Cecily Garsia  Encounter Provider(s):    Arturo Church MD       To Whom It May Concern:    Cecily Garsia was seen and treated in our department on 10/10/2024. She can return to work.Andrew Garsia was with the pt the entire stay in the ER.    If you have any questions or concerns, please do not hesitate to call.        _____________________________  Physician/APC Signature

## (undated) NOTE — LETTER
Date & Time: 10/29/2024, 1:52 PM  Patient: Cecily Garsia  Encounter Provider(s):    Arturo Church MD       To Whom It May Concern:    Cecily Garsia was seen and treated in our department on 10/29/2024. She can return to work.    If you have any questions or concerns, please do not hesitate to call.        _____________________________  Physician/APC Signature

## (undated) NOTE — LETTER
[address]  [address]  Authorization for Surgical Operation and Procedure     Date:___________                                                                                                         Time:__________  I hereby authorize Surgeon(s):  Ian Banks MD, my physician and his/her assistants (if applicable), which may include medical students, residents, and/or fellows, to perform the following surgical operation/ procedure and administer such anesthesia as may be determined necessary by my physician:  Operation/Procedure name (s) Procedure(s):  ESOPHAGOGASTRODUODENOSCOPY (EGD), COLONOSCOPY  COLONOSCOPY on Cecilyalexis Garsia   2.   I recognize that during the surgical operation/procedure, unforeseen conditions may necessitate additional or different procedures than those listed above.  I, therefore, further authorize and request that the above-named surgeon, assistants, or designees perform such procedures as are, in their judgment, necessary and desirable.    3.   My surgeon/physician has discussed prior to my surgery the potential benefits, risks and side effects of this procedure; the likelihood of achieving goals; and potential problems that might occur during recuperation.  They also discussed reasonable alternatives to the procedure, including risks, benefits, and side effects related to the alternatives and risks related to not receiving this procedure.  I have had all my questions answered and I acknowledge that no guarantee has been made as to the result that may be obtained.    4.   Should the need arise during my operation/procedure, which includes change of level of care prior to discharge, I also consent to the administration of blood and/or blood products.  Further, I understand that despite careful testing and screening of blood or blood products by collecting agencies, I may still be subject to ill effects as a result of receiving a blood  transfusion and/or blood products.  The following are some, but not all, of the potential risks that can occur: fever and allergic reactions, hemolytic reactions, transmission of diseases such as Hepatitis, AIDS and Cytomegalovirus (CMV) and fluid overload.  In the event that I wish to have an autologous transfusion of my own blood, or a directed donor transfusion, I will discuss this with my physician.  Check only if Refusing Blood or Blood Products  I understand refusal of blood or blood products as deemed necessary by my physician may have serious consequences to my condition to include possible death. I hereby assume responsibility for my refusal and release the hospital, its personnel, and my physicians from any responsibility for the consequences of my refusal.          o  Refuse      5.   I authorize the use of any specimen, organs, tissues, body parts or foreign objects that may be removed from my body during the operation/procedure for diagnosis, research or teaching purposes and their subsequent disposal by hospital authorities.  I also authorize the release of specimen test results and/or written reports to my treating physician on the hospital medical staff or other referring or consulting physicians involved in my care, at the discretion of the Pathologist or my treating physician.    6.   I consent to the photographing or videotaping of the operations or procedures to be performed, including appropriate portions of my body for medical, scientific, or educational purposes, provided my identity is not revealed by the pictures or by descriptive texts accompanying them.  If the procedure has been photographed/videotaped, the surgeon will obtain the original picture, image, videotape or CD.  The hospital will not be responsible for storage, release or maintenance of the picture, image, tape or CD.    7.   I consent to the presence of a  or observers in the operating room as deemed  necessary by my physician or their designees.    8.   I recognize that in the event my procedure results in extended X-Ray/fluoroscopy time, I may develop a skin reaction.    9. If I have a Do Not Attempt Resuscitation (DNAR) order in place, that status will be suspended while in the operating room, procedural suite, and during the recovery period unless otherwise explicitly stated by me (or a person authorized to consent on my behalf). The surgeon or my attending physician will determine when the applicable recovery period ends for purposes of reinstating the DNAR order.  10. Patients having a sterilization procedure: I understand that if the procedure is successful the results will be permanent and it will therefore be impossible for me to inseminate, conceive, or bear children.  I also understand that the procedure is intended to result in sterility, although the result has not been guaranteed.   11. I acknowledge that my physician has explained sedation/analgesia administration to me including the risk and benefits I consent to the administration of sedation/analgesia as may be necessary or desirable in the judgment of my physician.    I CERTIFY THAT I HAVE READ AND FULLY UNDERSTAND THE ABOVE CONSENT TO OPERATION and/or OTHER PROCEDURE.    _________________________________________  __________________________________  Signature of Patient     Signature of Responsible Person         ___________________________________         Printed Name of Responsible Person           _________________________________                 Relationship to Patient  _________________________________________  ______________________________  Signature of Witness          Date  Time      Patient Name: Cecily LEMUS Sun     : 1990                 Printed: 2024     Medical Record #: [id number]                     Page 1 of 2                                    [address]   [address]    Consent for Anesthesia    I, Cecily Garsia agree to be cared for by an anesthesiologist, who is specially trained to monitor me and give me medicine to put me to sleep or keep me comfortable during my procedure    I understand that my anesthesiologist is not an employee or agent of [facility] or [employer]. He or she works for [email or web address].    As the patient asking for anesthesia services, I agree to:  Allow the anesthesiologist (anesthesia doctor) to give me medicine and do additional procedures as necessary. Some examples are: Starting or using an “IV” to give me medicine, fluids or blood during my procedure, and having a breathing tube placed to help me breathe when I’m asleep (intubation). In the event that my heart stops working properly, I understand that my anesthesiologist will make every effort to sustain my life, unless otherwise directed by [facility] Do Not Resuscitate documents.  Tell my anesthesia doctor before my procedure:  If I am pregnant.  The last time that I ate or drank.  All of the medicines I take (including prescriptions, herbal supplements, and pills I can buy without a prescription (including street drugs/illegal medications). Failure to inform my anesthesiologist about these medicines may increase my risk of anesthetic complications.  If I am allergic to anything or have had a reaction to anesthesia before.  I understand how the anesthesia medicine will help me (benefits).  I understand that with any type of anesthesia medicine there are risks:  The most common risks are: nausea, vomiting, sore throat, muscle soreness, damage to my eyes, mouth, or teeth (from breathing tube placement).  Rare risks include: remembering what happened during my procedure, allergic reactions to medications, injury to my airway, heart, lungs, vision, nerves, or muscles and in extremely rare instances death.  My doctor has explained to me other choices  available to me for my care (alternatives).  Pregnant Patients (“epidural”):  I understand that the risks of having an epidural (medicine given into my back to help control pain during labor), include itching, low blood pressure, difficulty urinating, headache or slowing of the baby’s heart. Very rare risks include infection, bleeding, seizure, irregular heart rhythms and nerve injury.  Regional Anesthesia (“spinal”, “epidural”, & “nerve blocks”):  I understand that rare but potential complications include headache, bleeding, infection, seizure, irregular heart rhythms, and nerve injury.    I can change my mind about having anesthesia services at any time before I get the medicine.    _____________________________________________________________________________  Patient (or Representative) Signature/Relationship to Patient  Date   Time    _____________________________________________________________________________   Name (if used)    Language/Organization   Time    _____________________________________________________________________________  Anesthesiologist Signature     Date   Time  I have discussed the procedure and information above with the patient (or patient’s representative) and answered their questions. The patient or their representative has agreed to have anesthesia services.    _____________________________________________________________________________  Witness        Date   Time  I have verified that the signature is that of the patient or patient’s representative, and that it was signed before the procedure  Patient Name: Cecily Garsia     : 1990                 Printed: 2024     Medical Record #: [id number]                     Page 2 of 2

## (undated) NOTE — LETTER
Date & Time: 10/11/2024, 2:52 AM  Patient: Cecily Garsia  Encounter Provider(s):    Arturo Church MD       To Whom It May Concern:    Cecily Garsia was seen and treated in our department on 10/10/2024. She can return to work. Andrew Stuart has been in the ER with Cecily.    If you have any questions or concerns, please do not hesitate to call.        _____________________________  Physician/APC Signature

## (undated) NOTE — ED AVS SNAPSHOT
Brock Tsang   MRN: [id number]    Department:  [facility] Emergency Department in [place]   Date of Visit:  9/5/2017           Disclosure     Insurance plans vary and the physician(s) referred by the ER may not be covered by your plan.  Please co If you have been prescribed any medication(s), please fill your prescription right away and begin taking the medication(s) as directed    If the emergency physician has read X-rays, these will be re-interpreted by a radiologist.  If there is a significant

## (undated) NOTE — ED AVS SNAPSHOT
Kirstin Chavez   MRN: [id number]    Department:  [facility] Emergency Department   Date of Visit:  12/30/2018           Disclosure     Insurance plans vary and the physician(s) referred by the ER may not be covered by your plan.  Please conta tell this physician (or your personal doctor if your instructions are to return to your personal doctor) about any new or lasting problems. The primary care or specialist physician will see patients referred from the [facility] Emergency Department.  Zachariah Francis

## (undated) NOTE — ED AVS SNAPSHOT
Yvette Vick   MRN: [id number]    Department:  [facility] in [place]   Date of Visit:  9/20/2017           Disclosure     Insurance plans vary and the physician(s) referred by the ER may not be covered by your plan.  Please c If you have been prescribed any medication(s), please fill your prescription right away and begin taking the medication(s) as directed    If the emergency physician has read X-rays, these will be re-interpreted by a radiologist.  If there is a significant

## (undated) NOTE — LETTER
Date & Time: 7/16/2020, 4:55 PM  Patient: Aashish Grief  Encounter Provider(s):    Gurpreet Funes, DO       To Whom It May Concern:    Matilde Colorado was seen and treated in our department on 7/16/2020.  She should not return to work until 7/2

## (undated) NOTE — ED AVS SNAPSHOT
Stephany Aruna   MRN: [id number]    Department:  [address] [facility] in [place]   Date of Visit:  3/27/2018           Disclosure     Insurance plans vary and the physician(s) referred by the ER may not be covered by your plan.  Please c tell this physician (or your personal doctor if your instructions are to return to your personal doctor) about any new or lasting problems. The primary care or specialist physician will see patients referred from the [facility] Emergency Department.  Severo Pastures

## (undated) NOTE — LETTER
Date & Time: 4/18/2024, 9:04 PM  Patient: Cecily Garsia  Encounter Provider(s):    Ines Henson MD Kostner, JAGRUTI Chaudhary       To Whom It May Concern:    Cecily Garsia was seen and treated in our department on 4/18/2024. She can return to work.  Please excuse Andrew for he drove the patient to the emergency room.  He may return to work tomorrow.    If you have any questions or concerns, please do not hesitate to call.        _____________________________  Physician/APC Signature

## (undated) NOTE — ED AVS SNAPSHOT
Gary Merino   MRN: [id number]    Department:  [facility] Emergency Department in [place]   Date of Visit:  12/6/2018           Disclosure     Insurance plans vary and the physician(s) referred by the ER may not be covered by your plan.  Please c tell this physician (or your personal doctor if your instructions are to return to your personal doctor) about any new or lasting problems. The primary care or specialist physician will see patients referred from the [facility] Emergency Department.  Cheryle Levins

## (undated) NOTE — ED AVS SNAPSHOT
Gary Wilber   MRN: [id number]    Department:  [facility] Emergency Department in [place]   Date of Visit:  11/10/2019           Disclosure     Insurance plans vary and the physician(s) referred by the ER may not be covered by your plan.  Please tell this physician (or your personal doctor if your instructions are to return to your personal doctor) about any new or lasting problems. The primary care or specialist physician will see patients referred from the [facility] Emergency Department.  Sharon Hanson

## (undated) NOTE — ED AVS SNAPSHOT
[facility] Emergency Department in [address] [facility]    Phone:  [phone]    Fax:  [facility]   MRN: [id number]    Department:  [facility] Emergency Department in [place]   Date of V IF THERE IS ANY CHANGE OR WORSENING OF YOUR CONDITION, CALL YOUR PRIMARY CARE PHYSICIAN AT ONCE OR RETURN IMMEDIATELY TO THE EMERGENCY DEPARTMENT.     If you have been prescribed any medication(s), please fill your prescription right away and begin taking t

## (undated) NOTE — LETTER
Date & Time: 10/11/2024, 2:45 AM  Patient: Cecily Garsia  Encounter Provider(s):    Arturo Church MD       To Whom It May Concern:    Cecily Garsia was seen and treated in our department on 10/10/2024. She {Return to school/sport/work:[id number]}. Andrew Sun was present with the pt during the entire stay.    If you have any questions or concerns, please do not hesitate to call.        _____________________________  Physician/APC Signature

## (undated) NOTE — ED AVS SNAPSHOT
Beto Elmore   MRN: [id number]    Department:  [address] Emergency Department in [place]   Date of Visit:  9/8/2017           Disclosure     Insurance plans vary and the physician(s) referred by the ER may not be covered by your plan.  Please co If you have been prescribed any medication(s), please fill your prescription right away and begin taking the medication(s) as directed    If the emergency physician has read X-rays, these will be re-interpreted by a radiologist.  If there is a significant